# Patient Record
Sex: FEMALE | Race: WHITE | Employment: PART TIME | URBAN - METROPOLITAN AREA
[De-identification: names, ages, dates, MRNs, and addresses within clinical notes are randomized per-mention and may not be internally consistent; named-entity substitution may affect disease eponyms.]

---

## 2010-02-25 LAB
AMB EXT TSH: 1.93 MIU/ML
HCT: 37 % (ref 35–48)
HGB: 12.4 G/DL (ref 12–16)
MEAN CELL VOLUME: 79.7 FL (ref 80–100)
PLATELETS: 262 10(3)UL
WBC: 6.8 X10(3) UL (ref 4–11)

## 2019-07-29 ENCOUNTER — OFFICE VISIT (OUTPATIENT)
Dept: FAMILY MEDICINE CLINIC | Facility: CLINIC | Age: 49
End: 2019-07-29
Payer: COMMERCIAL

## 2019-07-29 ENCOUNTER — LAB ENCOUNTER (OUTPATIENT)
Dept: LAB | Age: 49
End: 2019-07-29
Attending: FAMILY MEDICINE
Payer: COMMERCIAL

## 2019-07-29 VITALS
BODY MASS INDEX: 36.76 KG/M2 | DIASTOLIC BLOOD PRESSURE: 82 MMHG | SYSTOLIC BLOOD PRESSURE: 118 MMHG | WEIGHT: 218 LBS | HEART RATE: 72 BPM | HEIGHT: 64.61 IN

## 2019-07-29 DIAGNOSIS — Z00.00 LABORATORY EXAMINATION ORDERED AS PART OF A ROUTINE GENERAL MEDICAL EXAMINATION: ICD-10-CM

## 2019-07-29 DIAGNOSIS — N91.2 AMENORRHEA: ICD-10-CM

## 2019-07-29 DIAGNOSIS — Z12.11 COLON CANCER SCREENING: ICD-10-CM

## 2019-07-29 DIAGNOSIS — Z12.4 SCREENING FOR MALIGNANT NEOPLASM OF CERVIX: ICD-10-CM

## 2019-07-29 DIAGNOSIS — Z01.419 WELL FEMALE EXAM WITH ROUTINE GYNECOLOGICAL EXAM: Primary | ICD-10-CM

## 2019-07-29 DIAGNOSIS — Z12.39 BREAST CANCER SCREENING: ICD-10-CM

## 2019-07-29 DIAGNOSIS — E66.9 OBESITY (BMI 35.0-39.9 WITHOUT COMORBIDITY): ICD-10-CM

## 2019-07-29 DIAGNOSIS — D22.9 MULTIPLE PIGMENTED NEVI: ICD-10-CM

## 2019-07-29 DIAGNOSIS — L40.9 PSORIASIS: ICD-10-CM

## 2019-07-29 DIAGNOSIS — R73.9 HYPERGLYCEMIA: ICD-10-CM

## 2019-07-29 LAB
ALBUMIN SERPL-MCNC: 3.8 G/DL (ref 3.4–5)
ALBUMIN/GLOB SERPL: 1.2 {RATIO} (ref 1–2)
ALP LIVER SERPL-CCNC: 80 U/L (ref 39–100)
ALT SERPL-CCNC: 36 U/L (ref 13–56)
ANION GAP SERPL CALC-SCNC: 5 MMOL/L (ref 0–18)
AST SERPL-CCNC: 18 U/L (ref 15–37)
BASOPHILS # BLD AUTO: 0.06 X10(3) UL (ref 0–0.2)
BASOPHILS NFR BLD AUTO: 0.9 %
BILIRUB SERPL-MCNC: 0.6 MG/DL (ref 0.1–2)
BUN BLD-MCNC: 11 MG/DL (ref 7–18)
BUN/CREAT SERPL: 13.1 (ref 10–20)
CALCIUM BLD-MCNC: 9.2 MG/DL (ref 8.5–10.1)
CHLORIDE SERPL-SCNC: 108 MMOL/L (ref 98–112)
CHOLEST SMN-MCNC: 162 MG/DL (ref ?–200)
CO2 SERPL-SCNC: 29 MMOL/L (ref 21–32)
CREAT BLD-MCNC: 0.84 MG/DL (ref 0.55–1.02)
DEPRECATED RDW RBC AUTO: 38.2 FL (ref 35.1–46.3)
EOSINOPHIL # BLD AUTO: 0.36 X10(3) UL (ref 0–0.7)
EOSINOPHIL NFR BLD AUTO: 5.6 %
ERYTHROCYTE [DISTWIDTH] IN BLOOD BY AUTOMATED COUNT: 11.9 % (ref 11–15)
ESTRADIOL SERPL-MCNC: 39 PG/ML
GLOBULIN PLAS-MCNC: 3.3 G/DL (ref 2.8–4.4)
GLUCOSE BLD-MCNC: 115 MG/DL (ref 70–99)
HCT VFR BLD AUTO: 41.7 % (ref 35–48)
HDLC SERPL-MCNC: 46 MG/DL (ref 40–59)
HGB BLD-MCNC: 14.3 G/DL (ref 12–16)
IMM GRANULOCYTES # BLD AUTO: 0.02 X10(3) UL (ref 0–1)
IMM GRANULOCYTES NFR BLD: 0.3 %
LDLC SERPL CALC-MCNC: 64 MG/DL (ref ?–100)
LYMPHOCYTES # BLD AUTO: 2.31 X10(3) UL (ref 1–4)
LYMPHOCYTES NFR BLD AUTO: 35.7 %
M PROTEIN MFR SERPL ELPH: 7.1 G/DL (ref 6.4–8.2)
MCH RBC QN AUTO: 30.4 PG (ref 26–34)
MCHC RBC AUTO-ENTMCNC: 34.3 G/DL (ref 31–37)
MCV RBC AUTO: 88.7 FL (ref 80–100)
MONOCYTES # BLD AUTO: 0.49 X10(3) UL (ref 0.1–1)
MONOCYTES NFR BLD AUTO: 7.6 %
NEUTROPHILS # BLD AUTO: 3.23 X10 (3) UL (ref 1.5–7.7)
NEUTROPHILS # BLD AUTO: 3.23 X10(3) UL (ref 1.5–7.7)
NEUTROPHILS NFR BLD AUTO: 49.9 %
NONHDLC SERPL-MCNC: 116 MG/DL (ref ?–130)
OSMOLALITY SERPL CALC.SUM OF ELEC: 294 MOSM/KG (ref 275–295)
PLATELET # BLD AUTO: 255 10(3)UL (ref 150–450)
POTASSIUM SERPL-SCNC: 4.8 MMOL/L (ref 3.5–5.1)
RBC # BLD AUTO: 4.7 X10(6)UL (ref 3.8–5.3)
SODIUM SERPL-SCNC: 142 MMOL/L (ref 136–145)
T4 FREE SERPL-MCNC: 0.8 NG/DL (ref 0.8–1.7)
TRIGL SERPL-MCNC: 260 MG/DL (ref 30–149)
TSI SER-ACNC: 3.08 MIU/ML (ref 0.36–3.74)
VLDLC SERPL CALC-MCNC: 52 MG/DL (ref 0–30)
WBC # BLD AUTO: 6.5 X10(3) UL (ref 4–11)

## 2019-07-29 PROCEDURE — 88175 CYTOPATH C/V AUTO FLUID REDO: CPT | Performed by: FAMILY MEDICINE

## 2019-07-29 PROCEDURE — 80050 GENERAL HEALTH PANEL: CPT | Performed by: FAMILY MEDICINE

## 2019-07-29 PROCEDURE — 36415 COLL VENOUS BLD VENIPUNCTURE: CPT | Performed by: FAMILY MEDICINE

## 2019-07-29 PROCEDURE — 99386 PREV VISIT NEW AGE 40-64: CPT | Performed by: FAMILY MEDICINE

## 2019-07-29 PROCEDURE — 84439 ASSAY OF FREE THYROXINE: CPT | Performed by: FAMILY MEDICINE

## 2019-07-29 PROCEDURE — 80061 LIPID PANEL: CPT | Performed by: FAMILY MEDICINE

## 2019-07-29 PROCEDURE — 82670 ASSAY OF TOTAL ESTRADIOL: CPT | Performed by: FAMILY MEDICINE

## 2019-07-29 PROCEDURE — 87624 HPV HI-RISK TYP POOLED RSLT: CPT | Performed by: FAMILY MEDICINE

## 2019-07-29 PROCEDURE — 83036 HEMOGLOBIN GLYCOSYLATED A1C: CPT | Performed by: FAMILY MEDICINE

## 2019-07-29 RX ORDER — ATORVASTATIN CALCIUM 10 MG/1
10 TABLET, FILM COATED ORAL
Refills: 5 | COMMUNITY
Start: 2019-06-29 | End: 2020-07-31

## 2019-07-29 RX ORDER — ACETYLGLUCOSAMINE 700 MG
1-3 CAPSULE ORAL DAILY
COMMUNITY

## 2019-07-29 RX ORDER — ASPIRIN 81 MG/1
81 TABLET ORAL DAILY
COMMUNITY

## 2019-07-29 NOTE — PROGRESS NOTES
CBC and thyroid are normal.  Lipids elevated triglycerides reduce carbohydrates and saturated fats repeat lipids in 6 months.   Add omega-3 1 g daily and look into Mediterranean diet  Elevated blood sugar please add hemoglobin A1c rest of CMP is normal.

## 2019-07-29 NOTE — PROGRESS NOTES
HPI:   Aryan Gómez is a 47year old female who presents for a complete physical exam.   Symptoms: Ablation done no more periods 2012. Patient complains of some hot flashes. .   Had cholesterol RX Lipitor for 1.4 y/o  Abnormal pap never last one 2015 History    Tobacco Use      Smoking status: Never Smoker      Smokeless tobacco: Never Used    Alcohol use: Yes      Frequency: 2-4 times a month      Drinks per session: 1 or 2      Binge frequency: Never    Drug use: Never    Occ: .  : wally introitus and vagina are normal, normal discharge and normal cervix. Bimanual exam normal no adnexal masses or cervical motion tenderness, PAP was done    MUSCULOSKELETAL: gait elio,l no gross M/S defect.   EXTREMITIES: no clubbing, cyanosis, or edema  NE frequent meals of healthy combinations of protein and carbohydrate. Avoiding packaged/processed. 6. Breast cancer screening  - [facility] SCREENING BILAT (CPT=77067); Future    7.  Laboratory examination ordered as part of a routine general medical examinatio

## 2019-07-30 ENCOUNTER — TELEPHONE (OUTPATIENT)
Dept: FAMILY MEDICINE CLINIC | Facility: CLINIC | Age: 49
End: 2019-07-30

## 2019-07-30 DIAGNOSIS — E78.1 HYPERTRIGLYCERIDEMIA: ICD-10-CM

## 2019-07-30 DIAGNOSIS — R73.9 HYPERGLYCEMIA: Primary | ICD-10-CM

## 2019-07-30 LAB
EST. AVERAGE GLUCOSE BLD GHB EST-MCNC: 123 MG/DL (ref 68–126)
HBA1C MFR BLD HPLC: 5.9 % (ref ?–5.7)
HPV I/H RISK 1 DNA SPEC QL NAA+PROBE: NEGATIVE

## 2019-07-30 NOTE — TELEPHONE ENCOUNTER
LMOM to call office for results. NO answer. HA1C ordered from previous draw. 6 month lipid panel ordered.

## 2019-07-30 NOTE — TELEPHONE ENCOUNTER
A signed release of information form was successfully faxed to [facility] [phone]. I form was sent to scanning also.

## 2019-07-30 NOTE — TELEPHONE ENCOUNTER
----- Message from Chloe Bell PA-C sent at 7/29/2019  6:05 PM CDT -----  CBC and thyroid are normal.  Lipids elevated triglycerides reduce carbohydrates and saturated fats repeat lipids in 6 months.   Add omega-3 1 g daily and look into Mediterranean

## 2019-07-30 NOTE — PROGRESS NOTES
HBA1C indicates glucose intolerance try to reduce weight with diet and exercise. Reduce the simple and complex carbohydrates including the white bread, rice and pasta.  Increase vegetables, fruits, protein and substitute complex carbohydrates with higher f

## 2019-07-31 ENCOUNTER — TELEPHONE (OUTPATIENT)
Dept: FAMILY MEDICINE CLINIC | Facility: CLINIC | Age: 49
End: 2019-07-31

## 2019-07-31 DIAGNOSIS — R73.09 ELEVATED HEMOGLOBIN A1C: Primary | ICD-10-CM

## 2019-07-31 NOTE — TELEPHONE ENCOUNTER
Future labs ordered. Relayed results and recommendations to patient and she verbalized understanding and agreed with plan.      Copy of labs and my chart sign on information placed at  for patient to  per pts request.

## 2019-07-31 NOTE — TELEPHONE ENCOUNTER
----- Message from Lexi Allen PA-C sent at 7/29/2019  6:05 PM CDT -----  CBC and thyroid are normal.  Lipids elevated triglycerides reduce carbohydrates and saturated fats repeat lipids in 6 months.   Add omega-3 1 g daily and look into Mediterranean

## 2019-08-01 ENCOUNTER — TELEPHONE (OUTPATIENT)
Dept: FAMILY MEDICINE CLINIC | Facility: CLINIC | Age: 49
End: 2019-08-01

## 2019-08-01 NOTE — TELEPHONE ENCOUNTER
----- Message from Ashley Whelan PA-C sent at 8/1/2019  7:41 AM CDT -----  Pap is normal HPV negative.

## 2019-08-08 PROCEDURE — 82274 ASSAY TEST FOR BLOOD FECAL: CPT | Performed by: FAMILY MEDICINE

## 2019-08-15 ENCOUNTER — LAB ENCOUNTER (OUTPATIENT)
Dept: LAB | Age: 49
End: 2019-08-15
Attending: FAMILY MEDICINE
Payer: COMMERCIAL

## 2019-08-15 DIAGNOSIS — Z12.11 SPECIAL SCREENING FOR MALIGNANT NEOPLASMS, COLON: Primary | ICD-10-CM

## 2019-09-11 ENCOUNTER — TELEPHONE (OUTPATIENT)
Dept: FAMILY MEDICINE CLINIC | Facility: CLINIC | Age: 49
End: 2019-09-11

## 2019-09-11 NOTE — TELEPHONE ENCOUNTER
The patient was informed that Maria Fernanda Orozco did receive her previous medical records, but the most recent mammogram was from 2016 so she would still be due for the mammogram Maria Fernanda Orozco ordered in July.   The patient agreed to call to schedule her mammogram.  She

## 2020-07-07 ENCOUNTER — TELEPHONE (OUTPATIENT)
Dept: FAMILY MEDICINE CLINIC | Facility: CLINIC | Age: 50
End: 2020-07-07

## 2020-07-07 NOTE — TELEPHONE ENCOUNTER
LOV 7/29/19. Advised patient appointment would be needed to discuss need for requested tests. She declined appointment and states she will call another time to schedule.

## 2020-07-07 NOTE — TELEPHONE ENCOUNTER
Pt came in asking if Franklin Black wants her to have an US Abdominal aortic screening and US carotid doppler bilateral diagnostic testing, the same tests Franklin Black ordered for her  Robert Coma [birth year].

## 2020-07-09 ENCOUNTER — LAB ENCOUNTER (OUTPATIENT)
Dept: LAB | Age: 50
End: 2020-07-09
Attending: FAMILY MEDICINE
Payer: COMMERCIAL

## 2020-07-09 ENCOUNTER — HOSPITAL ENCOUNTER (OUTPATIENT)
Dept: CT IMAGING | Age: 50
Discharge: HOME | End: 2020-07-09
Attending: FAMILY MEDICINE

## 2020-07-09 DIAGNOSIS — E78.1 HYPERTRIGLYCERIDEMIA: ICD-10-CM

## 2020-07-09 DIAGNOSIS — Z13.9 ENCOUNTER FOR SCREENING: ICD-10-CM

## 2020-07-09 DIAGNOSIS — R73.09 ELEVATED HEMOGLOBIN A1C: ICD-10-CM

## 2020-07-09 LAB
CHOLEST SMN-MCNC: 191 MG/DL (ref ?–200)
EST. AVERAGE GLUCOSE BLD GHB EST-MCNC: 126 MG/DL (ref 68–126)
HBA1C MFR BLD HPLC: 6 % (ref ?–5.7)
HDLC SERPL-MCNC: 47 MG/DL (ref 40–59)
LDLC SERPL CALC-MCNC: 80 MG/DL (ref ?–100)
NONHDLC SERPL-MCNC: 144 MG/DL (ref ?–130)
PATIENT FASTING Y/N/NP: YES
TRIGL SERPL-MCNC: 318 MG/DL (ref 30–149)
VLDLC SERPL CALC-MCNC: 64 MG/DL (ref 0–30)

## 2020-07-09 PROCEDURE — 36415 COLL VENOUS BLD VENIPUNCTURE: CPT

## 2020-07-09 PROCEDURE — 80061 LIPID PANEL: CPT

## 2020-07-09 PROCEDURE — 83036 HEMOGLOBIN GLYCOSYLATED A1C: CPT

## 2020-07-09 NOTE — PROGRESS NOTES
You have a small solid pulmonary nodule that is 4 mm or smaller this does not need further testing since you are not a smoker. If you do have any prolonged exposure to chemicals that could put you at risk please send message back.   Sent to my chart

## 2020-07-11 NOTE — PROGRESS NOTES
Follow-up in the office for wellness. Crease carbohydrates. Decrease carbohydrates both simple and complex. Triglycerides are increasing start Omega 3 take a total of 2 grams (2,000 mg) of EPA plus DHA. Limit SUGAR!   Reduce all carbohydrates both sim

## 2020-07-14 ENCOUNTER — TELEPHONE (OUTPATIENT)
Dept: FAMILY MEDICINE CLINIC | Facility: CLINIC | Age: 50
End: 2020-07-14

## 2020-07-14 DIAGNOSIS — E78.1 HYPERTRIGLYCERIDEMIA: ICD-10-CM

## 2020-07-14 DIAGNOSIS — R73.09 ELEVATED HEMOGLOBIN A1C: Primary | ICD-10-CM

## 2020-07-14 NOTE — TELEPHONE ENCOUNTER
----- Message from Nacho Marcial PA-C sent at 7/11/2020  9:04 AM CDT -----  Follow-up in the office for wellness. Crease carbohydrates. Decrease carbohydrates both simple and complex.   Triglycerides are increasing start Omega 3 take a total of 2 gram

## 2020-07-29 ENCOUNTER — HOSPITAL ENCOUNTER (OUTPATIENT)
Dept: MAMMOGRAPHY | Age: 50
Discharge: HOME | End: 2020-07-29
Attending: FAMILY MEDICINE
Payer: COMMERCIAL

## 2020-07-29 DIAGNOSIS — Z12.39 BREAST CANCER SCREENING: ICD-10-CM

## 2020-07-29 PROCEDURE — 77063 BREAST TOMOSYNTHESIS BI: CPT | Performed by: FAMILY MEDICINE

## 2020-07-29 PROCEDURE — 77067 SCR MAMMO BI INCL CAD: CPT | Performed by: FAMILY MEDICINE

## 2020-07-29 NOTE — PROGRESS NOTES
4 mm oval nodule probable intramammary lymph node. There is more typical lymph node in the upper-outer quadrant of the left breast.  If there are no comparisons available thin   additional mammographic views and ultrasound are recommended.   Comparison pavithra

## 2020-07-31 ENCOUNTER — OFFICE VISIT (OUTPATIENT)
Dept: FAMILY MEDICINE CLINIC | Facility: CLINIC | Age: 50
End: 2020-07-31
Payer: COMMERCIAL

## 2020-07-31 VITALS
WEIGHT: 224 LBS | BODY MASS INDEX: 37.78 KG/M2 | SYSTOLIC BLOOD PRESSURE: 110 MMHG | TEMPERATURE: 97 F | DIASTOLIC BLOOD PRESSURE: 68 MMHG | HEART RATE: 80 BPM | HEIGHT: 64.49 IN

## 2020-07-31 DIAGNOSIS — Z00.00 LABORATORY EXAMINATION ORDERED AS PART OF A ROUTINE GENERAL MEDICAL EXAMINATION: ICD-10-CM

## 2020-07-31 DIAGNOSIS — D22.9 MULTIPLE PIGMENTED NEVI: ICD-10-CM

## 2020-07-31 DIAGNOSIS — E78.2 MIXED HYPERLIPIDEMIA: ICD-10-CM

## 2020-07-31 DIAGNOSIS — E78.1 HYPERTRIGLYCERIDEMIA: ICD-10-CM

## 2020-07-31 DIAGNOSIS — L40.9 PSORIASIS: ICD-10-CM

## 2020-07-31 DIAGNOSIS — Z12.11 COLON CANCER SCREENING: ICD-10-CM

## 2020-07-31 DIAGNOSIS — E66.9 OBESITY (BMI 35.0-39.9 WITHOUT COMORBIDITY): ICD-10-CM

## 2020-07-31 DIAGNOSIS — Z00.00 WELL FEMALE EXAM WITHOUT GYNECOLOGICAL EXAM: Primary | ICD-10-CM

## 2020-07-31 DIAGNOSIS — Z23 NEED FOR VACCINATION: ICD-10-CM

## 2020-07-31 PROCEDURE — 99396 PREV VISIT EST AGE 40-64: CPT | Performed by: FAMILY MEDICINE

## 2020-07-31 PROCEDURE — 90471 IMMUNIZATION ADMIN: CPT | Performed by: FAMILY MEDICINE

## 2020-07-31 PROCEDURE — 99212 OFFICE O/P EST SF 10 MIN: CPT | Performed by: FAMILY MEDICINE

## 2020-07-31 PROCEDURE — 3008F BODY MASS INDEX DOCD: CPT | Performed by: FAMILY MEDICINE

## 2020-07-31 PROCEDURE — 3074F SYST BP LT 130 MM HG: CPT | Performed by: FAMILY MEDICINE

## 2020-07-31 PROCEDURE — 90715 TDAP VACCINE 7 YRS/> IM: CPT | Performed by: FAMILY MEDICINE

## 2020-07-31 PROCEDURE — 3078F DIAST BP <80 MM HG: CPT | Performed by: FAMILY MEDICINE

## 2020-07-31 RX ORDER — ATORVASTATIN CALCIUM 10 MG/1
10 TABLET, FILM COATED ORAL
Qty: 90 TABLET | Refills: 1 | Status: SHIPPED | OUTPATIENT
Start: 2020-07-31

## 2020-07-31 RX ORDER — OMEGA-3-ACID ETHYL ESTERS 1 G/1
2 CAPSULE, LIQUID FILLED ORAL DAILY
Qty: 180 CAPSULE | Refills: 3 | Status: SHIPPED | OUTPATIENT
Start: 2020-07-31 | End: 2021-08-06

## 2020-07-31 NOTE — PROGRESS NOTES
HPI:   Dino Smith is a 45year old female who presents for a complete physical exam.   Shoulder pain sleeping on a waterbed. Symptoms: Ablation done no more periods 2012. Patient complains of some hot flashes manageable.       Wants to loose mg/dL    VLDL 64 (H) 0 - 30 mg/dL    Non HDL Chol 144 (H) <130 mg/dL    FASTING Yes    HEMOGLOBIN A1C   Result Value Ref Range    HgbA1C 6.0 (H) <5.7 %    Estimated Average Glucose 126 68 - 126 mg/dL        Current Outpatient Medications   Medication Sig D Never    Occ: . : yes. Children: no.   Exercise: none.   Diet: watches minimally     REVIEW OF SYSTEMS:   GENERAL: denies fevers, weakness, trouble sleeping or weight changes  SKIN: denies any unusual skin lesions or rashes  EYES:denies vis cyanosis, or edema  NEURO: oriented times three, cranial nerves are grossly intact, no gross motor or sensory deficit.     ASSESSMENT AND PLAN:   Reyes Hernandez is a 45year old female who presents for a complete physical exam.   Well female exam carbohydrate. Avoiding packaged/processed. 5. Hypertriglyceridemia  Start  Omega-3-acid Ethyl Esters 1 g Oral Cap; Take 2 capsules (2 g total) by mouth daily. Dispense: 180 capsule; Refill: 3  Carbohydrates.   6. Mixed hyperlipidemia  - atorvastatin 10

## 2020-08-06 NOTE — PROGRESS NOTES
RECOMMENDATIONS: After comparing prior mammograms additional evaluation is recommended  ULTRASOUND: LEFT BREAST  Sent to my chart

## 2021-04-09 DIAGNOSIS — Z23 NEED FOR VACCINATION: ICD-10-CM

## 2021-08-06 ENCOUNTER — OFFICE VISIT (OUTPATIENT)
Dept: FAMILY MEDICINE CLINIC | Facility: CLINIC | Age: 51
End: 2021-08-06
Payer: COMMERCIAL

## 2021-08-06 VITALS
DIASTOLIC BLOOD PRESSURE: 82 MMHG | HEIGHT: 64.33 IN | WEIGHT: 221 LBS | BODY MASS INDEX: 37.73 KG/M2 | HEART RATE: 72 BPM | SYSTOLIC BLOOD PRESSURE: 118 MMHG

## 2021-08-06 DIAGNOSIS — E78.2 MIXED HYPERLIPIDEMIA: ICD-10-CM

## 2021-08-06 DIAGNOSIS — Z13.820 OSTEOPOROSIS SCREENING: ICD-10-CM

## 2021-08-06 DIAGNOSIS — Z12.11 COLON CANCER SCREENING: ICD-10-CM

## 2021-08-06 DIAGNOSIS — L40.9 PSORIASIS: ICD-10-CM

## 2021-08-06 DIAGNOSIS — Z00.00 LABORATORY TESTS ORDERED AS PART OF A COMPLETE PHYSICAL EXAM (CPE): ICD-10-CM

## 2021-08-06 DIAGNOSIS — Z01.419 WELL FEMALE EXAM WITH ROUTINE GYNECOLOGICAL EXAM: Primary | ICD-10-CM

## 2021-08-06 DIAGNOSIS — D22.9 MULTIPLE PIGMENTED NEVI: ICD-10-CM

## 2021-08-06 DIAGNOSIS — R73.09 ELEVATED HEMOGLOBIN A1C: ICD-10-CM

## 2021-08-06 DIAGNOSIS — Z12.31 VISIT FOR SCREENING MAMMOGRAM: ICD-10-CM

## 2021-08-06 DIAGNOSIS — E78.1 HYPERTRIGLYCERIDEMIA: ICD-10-CM

## 2021-08-06 PROCEDURE — 3074F SYST BP LT 130 MM HG: CPT | Performed by: FAMILY MEDICINE

## 2021-08-06 PROCEDURE — 3008F BODY MASS INDEX DOCD: CPT | Performed by: FAMILY MEDICINE

## 2021-08-06 PROCEDURE — 3079F DIAST BP 80-89 MM HG: CPT | Performed by: FAMILY MEDICINE

## 2021-08-06 PROCEDURE — 99396 PREV VISIT EST AGE 40-64: CPT | Performed by: FAMILY MEDICINE

## 2021-08-06 NOTE — PROGRESS NOTES
HPI:   Susana Matthews is a 45year old female who presents for a complete physical exam.   Overdue for colonoscopy, shingles vaccine and mammogram  COVID vaccine  done  Dental exams yes  Eye exams today  PHQ9   @ a 0   Sleep Apnea  None witnesse kg/m².        Results for orders placed or performed in visit on 07/09/20   LIPID PANEL   Result Value Ref Range    Cholesterol, Total 191 <200 mg/dL    HDL Cholesterol 47 40 - 59 mg/dL    Triglycerides 318 (H) 30 - 149 mg/dL    LDL Cholesterol 80 <100 mg/d Vaping Use      Vaping Use: Never used    Alcohol use: Yes      Alcohol/week: 2.0 standard drinks      Types: 2 Standard drinks or equivalent per week    Drug use: Never    Occ: . : yes. Children: no.   Exercise: none.   Diet: watches mini are normal,   Bimanual exam normal no adnexal masses or cervical motion tenderness,     MUSCULOSKELETAL: gait elio,l no gross M/S defect.   EXTREMITIES: no clubbing, cyanosis, or edema  NEURO: oriented times three, cranial nerves are grossly intact, no keira complete physical exam (CPE)  - LIPID PANEL; Future  - HEMOGLOBIN A1C; Future  - COMP METABOLIC PANEL (14); Future  - CBC WITH DIFFERENTIAL WITH PLATELET; Future  - TSH+FREE T4; Future    8.  Visit for screening mammogram  - [facility] 2D+3D SCREENING BILAT (

## 2021-08-06 NOTE — PATIENT INSTRUCTIONS
Routine [facility] for Women   Routine checkups can find treatable problems early. For many medical problems, early treatment can help prevent more serious complications. The value of checkups and how often you have them depend mainly on your age.  Your per family history of high cholesterol. • Colorectal cancer test: if you are 48 or older.  Recommended tests include a yearly test for blood in the stool, called the fecal occult blood test (FOBT) or fecal immunochemical test (FIT), and one of the following t personal and family medical history. Many other tests are often done at routine checkups, but there is no current evidence that they are helpful as routine screening tests for healthy women.  Examples of such tests are a CBC (complete blood count), thyroi a younger age if you have a high-risk medical condition, such as diabetes. • Varicella (chickenpox) if you have never had chickenpox. • Zoster (shingles) vaccine: if you are 50 or older. The vaccine can help prevent shingles.  It can also reduce the anton

## 2021-08-11 ENCOUNTER — LAB ENCOUNTER (OUTPATIENT)
Dept: LAB | Facility: HOSPITAL | Age: 51
End: 2021-08-11
Attending: FAMILY MEDICINE
Payer: COMMERCIAL

## 2021-08-11 DIAGNOSIS — Z12.11 COLON CANCER SCREENING: Primary | ICD-10-CM

## 2021-08-11 PROCEDURE — 82274 ASSAY TEST FOR BLOOD FECAL: CPT

## 2021-08-12 ENCOUNTER — LAB ENCOUNTER (OUTPATIENT)
Dept: LAB | Age: 51
End: 2021-08-12
Attending: FAMILY MEDICINE
Payer: COMMERCIAL

## 2021-08-12 DIAGNOSIS — E78.2 MIXED HYPERLIPIDEMIA: ICD-10-CM

## 2021-08-12 DIAGNOSIS — E78.1 HYPERTRIGLYCERIDEMIA: ICD-10-CM

## 2021-08-12 DIAGNOSIS — Z00.00 LABORATORY TESTS ORDERED AS PART OF A COMPLETE PHYSICAL EXAM (CPE): ICD-10-CM

## 2021-08-12 DIAGNOSIS — R73.09 ELEVATED HEMOGLOBIN A1C: ICD-10-CM

## 2021-08-12 LAB
ALBUMIN SERPL-MCNC: 3.8 G/DL (ref 3.4–5)
ALBUMIN/GLOB SERPL: 1.1 {RATIO} (ref 1–2)
ALP LIVER SERPL-CCNC: 94 U/L
ALT SERPL-CCNC: 53 U/L
ANION GAP SERPL CALC-SCNC: 3 MMOL/L (ref 0–18)
AST SERPL-CCNC: 24 U/L (ref 15–37)
BASOPHILS # BLD AUTO: 0.05 X10(3) UL (ref 0–0.2)
BASOPHILS NFR BLD AUTO: 0.7 %
BILIRUB SERPL-MCNC: 0.6 MG/DL (ref 0.1–2)
BUN BLD-MCNC: 12 MG/DL (ref 7–18)
CALCIUM BLD-MCNC: 9.1 MG/DL (ref 8.5–10.1)
CHLORIDE SERPL-SCNC: 108 MMOL/L (ref 98–112)
CHOLEST SMN-MCNC: 195 MG/DL (ref ?–200)
CO2 SERPL-SCNC: 29 MMOL/L (ref 21–32)
CREAT BLD-MCNC: 0.93 MG/DL
EOSINOPHIL # BLD AUTO: 0.32 X10(3) UL (ref 0–0.7)
EOSINOPHIL NFR BLD AUTO: 4.5 %
ERYTHROCYTE [DISTWIDTH] IN BLOOD BY AUTOMATED COUNT: 12.1 %
EST. AVERAGE GLUCOSE BLD GHB EST-MCNC: 128 MG/DL (ref 68–126)
GLOBULIN PLAS-MCNC: 3.6 G/DL (ref 2.8–4.4)
GLUCOSE BLD-MCNC: 100 MG/DL (ref 70–99)
HBA1C MFR BLD HPLC: 6.1 % (ref ?–5.7)
HCT VFR BLD AUTO: 43.7 %
HDLC SERPL-MCNC: 46 MG/DL (ref 40–59)
HGB BLD-MCNC: 15.1 G/DL
IMM GRANULOCYTES # BLD AUTO: 0.02 X10(3) UL (ref 0–1)
IMM GRANULOCYTES NFR BLD: 0.3 %
LDLC SERPL CALC-MCNC: 106 MG/DL (ref ?–100)
LYMPHOCYTES # BLD AUTO: 2.87 X10(3) UL (ref 1–4)
LYMPHOCYTES NFR BLD AUTO: 40.4 %
M PROTEIN MFR SERPL ELPH: 7.4 G/DL (ref 6.4–8.2)
MCH RBC QN AUTO: 29.8 PG (ref 26–34)
MCHC RBC AUTO-ENTMCNC: 34.6 G/DL (ref 31–37)
MCV RBC AUTO: 86.2 FL
MONOCYTES # BLD AUTO: 0.56 X10(3) UL (ref 0.1–1)
MONOCYTES NFR BLD AUTO: 7.9 %
NEUTROPHILS # BLD AUTO: 3.29 X10 (3) UL (ref 1.5–7.7)
NEUTROPHILS # BLD AUTO: 3.29 X10(3) UL (ref 1.5–7.7)
NEUTROPHILS NFR BLD AUTO: 46.2 %
NONHDLC SERPL-MCNC: 149 MG/DL (ref ?–130)
OSMOLALITY SERPL CALC.SUM OF ELEC: 290 MOSM/KG (ref 275–295)
PATIENT FASTING Y/N/NP: YES
PATIENT FASTING Y/N/NP: YES
PLATELET # BLD AUTO: 231 10(3)UL (ref 150–450)
POTASSIUM SERPL-SCNC: 4.7 MMOL/L (ref 3.5–5.1)
RBC # BLD AUTO: 5.07 X10(6)UL
SODIUM SERPL-SCNC: 140 MMOL/L (ref 136–145)
T4 FREE SERPL-MCNC: 0.8 NG/DL (ref 0.8–1.7)
TRIGL SERPL-MCNC: 254 MG/DL (ref 30–149)
TSI SER-ACNC: 2.54 MIU/ML (ref 0.36–3.74)
VLDLC SERPL CALC-MCNC: 43 MG/DL (ref 0–30)
WBC # BLD AUTO: 7.1 X10(3) UL (ref 4–11)

## 2021-08-12 PROCEDURE — 80061 LIPID PANEL: CPT

## 2021-08-12 PROCEDURE — 84439 ASSAY OF FREE THYROXINE: CPT

## 2021-08-12 PROCEDURE — 85025 COMPLETE CBC W/AUTO DIFF WBC: CPT

## 2021-08-12 PROCEDURE — 80053 COMPREHEN METABOLIC PANEL: CPT

## 2021-08-12 PROCEDURE — 84443 ASSAY THYROID STIM HORMONE: CPT

## 2021-08-12 PROCEDURE — 36415 COLL VENOUS BLD VENIPUNCTURE: CPT

## 2021-08-12 PROCEDURE — 83036 HEMOGLOBIN GLYCOSYLATED A1C: CPT

## 2021-08-13 ENCOUNTER — TELEPHONE (OUTPATIENT)
Dept: FAMILY MEDICINE CLINIC | Facility: CLINIC | Age: 51
End: 2021-08-13

## 2021-08-13 DIAGNOSIS — E78.1 HYPERTRIGLYCERIDEMIA: ICD-10-CM

## 2021-08-13 DIAGNOSIS — R73.9 HYPERGLYCEMIA: Primary | ICD-10-CM

## 2021-08-13 LAB — HEMOCCULT STL QL: NEGATIVE

## 2021-08-13 NOTE — PROGRESS NOTES
Hemoglobin A1c 6.1 increasing risk for diabetes follow-up in the office to discuss. Normal thyroid and complete blood count. HBA1C indicates glucose intolerance try to reduce weight with diet and exercise.   Reduce the simple and complex carbohydrates incl

## 2021-08-13 NOTE — TELEPHONE ENCOUNTER
----- Message from Bertis Litten, PA-C sent at 8/12/2021  8:25 PM CDT -----  Hemoglobin A1c 6.1 increasing risk for diabetes follow-up in the office to discuss. Normal thyroid and complete blood count.  HBA1C indicates glucose intolerance try to reduce

## 2022-08-12 ENCOUNTER — LAB ENCOUNTER (OUTPATIENT)
Dept: LAB | Age: 52
End: 2022-08-12
Attending: FAMILY MEDICINE
Payer: COMMERCIAL

## 2022-08-12 ENCOUNTER — OFFICE VISIT (OUTPATIENT)
Dept: FAMILY MEDICINE CLINIC | Facility: CLINIC | Age: 52
End: 2022-08-12
Payer: COMMERCIAL

## 2022-08-12 VITALS
OXYGEN SATURATION: 98 % | BODY MASS INDEX: 38.28 KG/M2 | DIASTOLIC BLOOD PRESSURE: 72 MMHG | HEART RATE: 84 BPM | SYSTOLIC BLOOD PRESSURE: 102 MMHG | WEIGHT: 227 LBS | HEIGHT: 64.49 IN | TEMPERATURE: 97 F

## 2022-08-12 DIAGNOSIS — E78.2 MIXED HYPERLIPIDEMIA: ICD-10-CM

## 2022-08-12 DIAGNOSIS — R73.9 HYPERGLYCEMIA: ICD-10-CM

## 2022-08-12 DIAGNOSIS — Z13.228 SCREENING FOR ENDOCRINE, METABOLIC AND IMMUNITY DISORDER: ICD-10-CM

## 2022-08-12 DIAGNOSIS — Z12.11 COLON CANCER SCREENING: ICD-10-CM

## 2022-08-12 DIAGNOSIS — Z13.0 SCREENING FOR ENDOCRINE, METABOLIC AND IMMUNITY DISORDER: ICD-10-CM

## 2022-08-12 DIAGNOSIS — Z13.220 LIPID SCREENING: ICD-10-CM

## 2022-08-12 DIAGNOSIS — L40.9 PSORIASIS: ICD-10-CM

## 2022-08-12 DIAGNOSIS — D22.9 MULTIPLE PIGMENTED NEVI: ICD-10-CM

## 2022-08-12 DIAGNOSIS — Z13.29 SCREENING FOR ENDOCRINE, METABOLIC AND IMMUNITY DISORDER: ICD-10-CM

## 2022-08-12 DIAGNOSIS — Z12.4 SCREENING FOR MALIGNANT NEOPLASM OF CERVIX: ICD-10-CM

## 2022-08-12 DIAGNOSIS — E78.1 HYPERTRIGLYCERIDEMIA: ICD-10-CM

## 2022-08-12 DIAGNOSIS — Z78.0 POST-MENOPAUSAL: ICD-10-CM

## 2022-08-12 DIAGNOSIS — Z12.31 VISIT FOR SCREENING MAMMOGRAM: ICD-10-CM

## 2022-08-12 DIAGNOSIS — Z23 NEED FOR VACCINATION: ICD-10-CM

## 2022-08-12 DIAGNOSIS — Z01.419 WELL FEMALE EXAM WITH ROUTINE GYNECOLOGICAL EXAM: Primary | ICD-10-CM

## 2022-08-12 DIAGNOSIS — K21.9 GASTROESOPHAGEAL REFLUX DISEASE WITHOUT ESOPHAGITIS: ICD-10-CM

## 2022-08-12 LAB
ALBUMIN SERPL-MCNC: 4.1 G/DL (ref 3.4–5)
ALBUMIN/GLOB SERPL: 1.3 {RATIO} (ref 1–2)
ALP LIVER SERPL-CCNC: 98 U/L
ALT SERPL-CCNC: 68 U/L
ANION GAP SERPL CALC-SCNC: 1 MMOL/L (ref 0–18)
AST SERPL-CCNC: 32 U/L (ref 15–37)
BASOPHILS # BLD AUTO: 0.07 X10(3) UL (ref 0–0.2)
BASOPHILS NFR BLD AUTO: 0.8 %
BILIRUB SERPL-MCNC: 0.5 MG/DL (ref 0.1–2)
BUN BLD-MCNC: 11 MG/DL (ref 7–18)
CALCIUM BLD-MCNC: 9.2 MG/DL (ref 8.5–10.1)
CHLORIDE SERPL-SCNC: 108 MMOL/L (ref 98–112)
CHOLEST SERPL-MCNC: 176 MG/DL (ref ?–200)
CO2 SERPL-SCNC: 28 MMOL/L (ref 21–32)
CREAT BLD-MCNC: 0.85 MG/DL
EOSINOPHIL # BLD AUTO: 0.4 X10(3) UL (ref 0–0.7)
EOSINOPHIL NFR BLD AUTO: 4.4 %
ERYTHROCYTE [DISTWIDTH] IN BLOOD BY AUTOMATED COUNT: 12.4 %
EST. AVERAGE GLUCOSE BLD GHB EST-MCNC: 131 MG/DL (ref 68–126)
FASTING PATIENT LIPID ANSWER: YES
FASTING STATUS PATIENT QL REPORTED: YES
GFR SERPLBLD BASED ON 1.73 SQ M-ARVRAT: 82 ML/MIN/1.73M2 (ref 60–?)
GLOBULIN PLAS-MCNC: 3.2 G/DL (ref 2.8–4.4)
GLUCOSE BLD-MCNC: 112 MG/DL (ref 70–99)
HBA1C MFR BLD: 6.2 % (ref ?–5.7)
HCT VFR BLD AUTO: 45.3 %
HDLC SERPL-MCNC: 43 MG/DL (ref 40–59)
HGB BLD-MCNC: 14.9 G/DL
IMM GRANULOCYTES # BLD AUTO: 0.02 X10(3) UL (ref 0–1)
IMM GRANULOCYTES NFR BLD: 0.2 %
LDLC SERPL CALC-MCNC: 80 MG/DL (ref ?–100)
LYMPHOCYTES # BLD AUTO: 3.12 X10(3) UL (ref 1–4)
LYMPHOCYTES NFR BLD AUTO: 34.3 %
MCH RBC QN AUTO: 29.6 PG (ref 26–34)
MCHC RBC AUTO-ENTMCNC: 32.9 G/DL (ref 31–37)
MCV RBC AUTO: 90.1 FL
MONOCYTES # BLD AUTO: 0.74 X10(3) UL (ref 0.1–1)
MONOCYTES NFR BLD AUTO: 8.1 %
NEUTROPHILS # BLD AUTO: 4.74 X10 (3) UL (ref 1.5–7.7)
NEUTROPHILS # BLD AUTO: 4.74 X10(3) UL (ref 1.5–7.7)
NEUTROPHILS NFR BLD AUTO: 52.2 %
NONHDLC SERPL-MCNC: 133 MG/DL (ref ?–130)
OSMOLALITY SERPL CALC.SUM OF ELEC: 284 MOSM/KG (ref 275–295)
PLATELET # BLD AUTO: 260 10(3)UL (ref 150–450)
POTASSIUM SERPL-SCNC: 4.8 MMOL/L (ref 3.5–5.1)
PROT SERPL-MCNC: 7.3 G/DL (ref 6.4–8.2)
RBC # BLD AUTO: 5.03 X10(6)UL
SODIUM SERPL-SCNC: 137 MMOL/L (ref 136–145)
T4 FREE SERPL-MCNC: 0.9 NG/DL (ref 0.8–1.7)
TRIGL SERPL-MCNC: 324 MG/DL (ref 30–149)
TSI SER-ACNC: 3.43 MIU/ML (ref 0.36–3.74)
VLDLC SERPL CALC-MCNC: 51 MG/DL (ref 0–30)
WBC # BLD AUTO: 9.1 X10(3) UL (ref 4–11)

## 2022-08-12 PROCEDURE — 3074F SYST BP LT 130 MM HG: CPT | Performed by: FAMILY MEDICINE

## 2022-08-12 PROCEDURE — 85025 COMPLETE CBC W/AUTO DIFF WBC: CPT

## 2022-08-12 PROCEDURE — 84439 ASSAY OF FREE THYROXINE: CPT

## 2022-08-12 PROCEDURE — 3008F BODY MASS INDEX DOCD: CPT | Performed by: FAMILY MEDICINE

## 2022-08-12 PROCEDURE — 99213 OFFICE O/P EST LOW 20 MIN: CPT | Performed by: FAMILY MEDICINE

## 2022-08-12 PROCEDURE — 99396 PREV VISIT EST AGE 40-64: CPT | Performed by: FAMILY MEDICINE

## 2022-08-12 PROCEDURE — 80061 LIPID PANEL: CPT

## 2022-08-12 PROCEDURE — 83036 HEMOGLOBIN GLYCOSYLATED A1C: CPT

## 2022-08-12 PROCEDURE — 84443 ASSAY THYROID STIM HORMONE: CPT

## 2022-08-12 PROCEDURE — 3078F DIAST BP <80 MM HG: CPT | Performed by: FAMILY MEDICINE

## 2022-08-12 PROCEDURE — 36415 COLL VENOUS BLD VENIPUNCTURE: CPT

## 2022-08-12 PROCEDURE — 80053 COMPREHEN METABOLIC PANEL: CPT

## 2022-08-12 PROCEDURE — 87624 HPV HI-RISK TYP POOLED RSLT: CPT | Performed by: FAMILY MEDICINE

## 2022-08-13 ENCOUNTER — HOSPITAL ENCOUNTER (OUTPATIENT)
Dept: MAMMOGRAPHY | Age: 52
Discharge: HOME | End: 2022-08-13
Attending: FAMILY MEDICINE
Payer: COMMERCIAL

## 2022-08-13 ENCOUNTER — HOSPITAL ENCOUNTER (OUTPATIENT)
Dept: BONE DENSITY | Age: 52
Discharge: HOME | End: 2022-08-13
Attending: FAMILY MEDICINE
Payer: COMMERCIAL

## 2022-08-13 DIAGNOSIS — Z78.0 POST-MENOPAUSAL: ICD-10-CM

## 2022-08-13 DIAGNOSIS — Z12.31 VISIT FOR SCREENING MAMMOGRAM: ICD-10-CM

## 2022-08-13 PROBLEM — K21.9 GASTROESOPHAGEAL REFLUX DISEASE WITHOUT ESOPHAGITIS: Status: ACTIVE | Noted: 2022-08-13

## 2022-08-13 PROBLEM — R73.9 HYPERGLYCEMIA: Status: ACTIVE | Noted: 2022-08-13

## 2022-08-13 PROCEDURE — 77063 BREAST TOMOSYNTHESIS BI: CPT | Performed by: FAMILY MEDICINE

## 2022-08-13 PROCEDURE — 77067 SCR MAMMO BI INCL CAD: CPT | Performed by: FAMILY MEDICINE

## 2022-08-13 NOTE — PROGRESS NOTES
Normal white and red blood cell counts. Normal kidney testing. Elevated ALT (liver enzyme) limit alcohol, avoid acetaminophen and lower saturated fats repeat liver function tests in 2 months. Normal LDL and cholesterol elevated triglycerides. Elevated triglycerides Omega 3 take a total of 2-3 grams (2,000-3,000 mg) of EPA plus DHA. Limit SUGAR! Reduce all carbohydrates both simple and complex. Increase the fiber. Exercise needs to loose weight. Eat fish 2 times per week. [place], herring, sardines, tuna and mackerel are a few types of fish that are especially high in omega-3 fatty acids. Can eat fruits and vegetables and small frequent meals of healthy combinations of protein and carbohydrate (fruits/vegetables). Avoiding packaged/processed foods and alcohol. Repeat triglyceride testing in 2 months. Normal thyroid testing    HBA1C indicates glucose intolerance try to reduce weight with diet and exercise. Reduce the simple and complex carbohydrates including the white bread, rice and pasta. Increase vegetables, fruits, protein and substitute complex carbohydrates with higher fiber/grained carbohydrates. Repeat the HBA1C in 6 months. Place future orders for 2 months for liver function tests, triglycerides and 6 months for hemoglobin A1c.

## 2022-08-15 LAB — HPV I/H RISK 1 DNA SPEC QL NAA+PROBE: NEGATIVE

## 2022-08-17 ENCOUNTER — TELEPHONE (OUTPATIENT)
Dept: FAMILY MEDICINE CLINIC | Facility: CLINIC | Age: 52
End: 2022-08-17

## 2022-08-17 DIAGNOSIS — E78.2 MIXED HYPERLIPIDEMIA: Primary | ICD-10-CM

## 2022-08-17 DIAGNOSIS — R73.9 HYPERGLYCEMIA: ICD-10-CM

## 2022-08-17 NOTE — TELEPHONE ENCOUNTER
----- Message from Pérez William PA-C sent at 8/13/2022  5:39 AM CDT -----  Normal white and red blood cell counts. Normal kidney testing. Elevated ALT (liver enzyme) limit alcohol, avoid acetaminophen and lower saturated fats repeat liver function tests in 2 months. Normal LDL and cholesterol elevated triglycerides. Elevated triglycerides Omega 3 take a total of 2-3 grams (2,000-3,000 mg) of EPA plus DHA. Limit SUGAR! Reduce all carbohydrates both simple and complex. Increase the fiber. Exercise needs to loose weight. Eat fish 2 times per week. [place], herring, sardines, tuna and mackerel are a few types of fish that are especially high in omega-3 fatty acids. Can eat fruits and vegetables and small frequent meals of healthy combinations of protein and carbohydrate (fruits/vegetables). Avoiding packaged/processed foods and alcohol. Repeat triglyceride testing in 2 months. Normal thyroid testing    HBA1C indicates glucose intolerance try to reduce weight with diet and exercise. Reduce the simple and complex carbohydrates including the white bread, rice and pasta. Increase vegetables, fruits, protein and substitute complex carbohydrates with higher fiber/grained carbohydrates. Repeat the HBA1C in 6 months. Place future orders for 2 months for liver function tests, triglycerides and 6 months for hemoglobin A1c.

## 2022-08-29 LAB — LAST PAP RESULT: NORMAL

## 2022-11-03 ENCOUNTER — TELEPHONE (OUTPATIENT)
Dept: FAMILY MEDICINE CLINIC | Facility: CLINIC | Age: 52
End: 2022-11-03

## 2022-11-03 NOTE — TELEPHONE ENCOUNTER
[facility] is following up on lab order for colon cancer screening that was mailed to pt. Left message to call back.

## 2022-11-04 ENCOUNTER — TELEPHONE (OUTPATIENT)
Dept: FAMILY MEDICINE CLINIC | Facility: CLINIC | Age: 52
End: 2022-11-04

## 2022-11-04 NOTE — TELEPHONE ENCOUNTER
Patient needs to have mammogram order to state 'screening\" and not \"diagnostic\". She  Already had it done 08/13/2022. Her insurance will not pay for it if states  \"diagnostic\". Also needs code for dexascan.

## 2022-11-04 NOTE — TELEPHONE ENCOUNTER
Spoke to patient. Symptoms are as stated below. Advised patient to proceed to UC for evaluation and treatment. She states she has to go to work and will do so after work.

## 2022-11-04 NOTE — TELEPHONE ENCOUNTER
Spoke to patient. Advised back in August a screening mammogram was done. She said she will speak to her insurance. She also has cleared up the DEXA scan question. Her insurance told her they pay $250 for DEXA and that [facility] Gallego is [facility].  She will look at the in network facilities and determine cost.

## 2022-11-04 NOTE — TELEPHONE ENCOUNTER
Pt called states she has had congestion and cough x2 weeks, runny nose, scratchy dry throat and for last week has had pain behind eye and teeth and gum pain.  Seeking advice

## 2023-01-06 ENCOUNTER — TELEPHONE (OUTPATIENT)
Dept: FAMILY MEDICINE CLINIC | Facility: CLINIC | Age: 53
End: 2023-01-06

## 2023-01-06 NOTE — TELEPHONE ENCOUNTER
[facility] is following up on lab order for colon cancer screening that was mailed to pt. Please encourage pt to complete this test within the next 2 wks. Discussed in detail w/patient. Patient verbalized understanding.

## 2023-07-25 ENCOUNTER — LAB ENCOUNTER (OUTPATIENT)
Dept: LAB | Age: 53
End: 2023-07-25
Attending: FAMILY MEDICINE
Payer: COMMERCIAL

## 2023-07-25 DIAGNOSIS — Z12.11 COLON CANCER SCREENING: ICD-10-CM

## 2023-07-25 PROCEDURE — 82274 ASSAY TEST FOR BLOOD FECAL: CPT

## 2023-07-26 ENCOUNTER — OFFICE VISIT (OUTPATIENT)
Dept: FAMILY MEDICINE CLINIC | Facility: CLINIC | Age: 53
End: 2023-07-26
Payer: COMMERCIAL

## 2023-07-26 VITALS
RESPIRATION RATE: 18 BRPM | TEMPERATURE: 97 F | HEIGHT: 64 IN | HEART RATE: 90 BPM | SYSTOLIC BLOOD PRESSURE: 126 MMHG | BODY MASS INDEX: 38.58 KG/M2 | DIASTOLIC BLOOD PRESSURE: 68 MMHG | WEIGHT: 226 LBS | OXYGEN SATURATION: 98 %

## 2023-07-26 DIAGNOSIS — Z12.31 VISIT FOR SCREENING MAMMOGRAM: ICD-10-CM

## 2023-07-26 DIAGNOSIS — Z00.00 WELL FEMALE EXAM WITHOUT GYNECOLOGICAL EXAM: Primary | ICD-10-CM

## 2023-07-26 DIAGNOSIS — H53.8 BLURRING OF VISUAL IMAGE OF BOTH EYES: ICD-10-CM

## 2023-07-26 DIAGNOSIS — Z12.31 ENCOUNTER FOR SCREENING MAMMOGRAM FOR MALIGNANT NEOPLASM OF BREAST: ICD-10-CM

## 2023-07-26 DIAGNOSIS — Z13.228 SCREENING FOR ENDOCRINE, NUTRITIONAL, METABOLIC AND IMMUNITY DISORDER: ICD-10-CM

## 2023-07-26 DIAGNOSIS — H53.9 VISUAL DISTURBANCE: ICD-10-CM

## 2023-07-26 DIAGNOSIS — Z78.0 POST-MENOPAUSAL: ICD-10-CM

## 2023-07-26 DIAGNOSIS — Z13.21 SCREENING FOR ENDOCRINE, NUTRITIONAL, METABOLIC AND IMMUNITY DISORDER: ICD-10-CM

## 2023-07-26 DIAGNOSIS — Z13.0 SCREENING FOR ENDOCRINE, NUTRITIONAL, METABOLIC AND IMMUNITY DISORDER: ICD-10-CM

## 2023-07-26 DIAGNOSIS — E78.1 HYPERTRIGLYCERIDEMIA: ICD-10-CM

## 2023-07-26 DIAGNOSIS — R19.8 ABDOMINAL FULLNESS: ICD-10-CM

## 2023-07-26 DIAGNOSIS — E78.2 MIXED HYPERLIPIDEMIA: ICD-10-CM

## 2023-07-26 DIAGNOSIS — Z13.29 SCREENING FOR ENDOCRINE, NUTRITIONAL, METABOLIC AND IMMUNITY DISORDER: ICD-10-CM

## 2023-07-26 DIAGNOSIS — R73.9 HYPERGLYCEMIA: ICD-10-CM

## 2023-07-26 DIAGNOSIS — R10.32 LEFT LOWER QUADRANT ABDOMINAL PAIN: ICD-10-CM

## 2023-07-26 DIAGNOSIS — E66.9 OBESITY (BMI 35.0-39.9 WITHOUT COMORBIDITY): ICD-10-CM

## 2023-07-26 DIAGNOSIS — L40.9 PSORIASIS: ICD-10-CM

## 2023-07-26 LAB — HEMOCCULT STL QL: NEGATIVE

## 2023-07-26 PROCEDURE — 3074F SYST BP LT 130 MM HG: CPT | Performed by: FAMILY MEDICINE

## 2023-07-26 PROCEDURE — 3078F DIAST BP <80 MM HG: CPT | Performed by: FAMILY MEDICINE

## 2023-07-26 PROCEDURE — 99396 PREV VISIT EST AGE 40-64: CPT | Performed by: FAMILY MEDICINE

## 2023-07-26 PROCEDURE — 3008F BODY MASS INDEX DOCD: CPT | Performed by: FAMILY MEDICINE

## 2023-07-26 PROCEDURE — 99213 OFFICE O/P EST LOW 20 MIN: CPT | Performed by: FAMILY MEDICINE

## 2023-07-26 RX ORDER — MULTIVITAMIN WITH IRON
50 TABLET ORAL DAILY
COMMUNITY

## 2023-07-26 RX ORDER — APREMILAST 30 MG/1
30 TABLET, FILM COATED ORAL 2 TIMES DAILY
COMMUNITY

## 2023-07-26 RX ORDER — CALCIUM CARBONATE 500 MG/1
1 TABLET, CHEWABLE ORAL DAILY
COMMUNITY

## 2023-07-27 PROBLEM — R10.32 LEFT LOWER QUADRANT ABDOMINAL PAIN: Status: ACTIVE | Noted: 2023-07-27

## 2023-07-27 PROBLEM — N91.2 AMENORRHEA: Status: RESOLVED | Noted: 2019-07-29 | Resolved: 2023-07-27

## 2023-07-27 PROBLEM — R19.8 ABDOMINAL FULLNESS: Status: ACTIVE | Noted: 2023-07-27

## 2023-07-27 RX ORDER — ST. JOHN'S WORT 300 MG
CAPSULE ORAL
COMMUNITY
Start: 2023-07-27

## 2023-10-01 LAB
ABSOLUTE BASOPHILS: 48 CELLS/UL (ref 0–200)
ABSOLUTE EOSINOPHILS: 312 CELLS/UL (ref 15–500)
ABSOLUTE LYMPHOCYTES: 2520 CELLS/UL (ref 850–3900)
ABSOLUTE MONOCYTES: 528 CELLS/UL (ref 200–950)
ABSOLUTE NEUTROPHILS: 4592 CELLS/UL (ref 1500–7800)
ALBUMIN/GLOBULIN RATIO: 1.6 (CALC) (ref 1–2.5)
ALBUMIN: 4.2 G/DL (ref 3.6–5.1)
ALKALINE PHOSPHATASE: 90 U/L (ref 37–153)
ALT: 27 U/L (ref 6–29)
AST: 19 U/L (ref 10–35)
BASOPHILS: 0.6 %
BILIRUBIN, TOTAL: 0.5 MG/DL (ref 0.2–1.2)
BUN: 15 MG/DL (ref 7–25)
CALCIUM: 9.5 MG/DL (ref 8.6–10.4)
CARBON DIOXIDE: 27 MMOL/L (ref 20–32)
CHLORIDE: 104 MMOL/L (ref 98–110)
CHOL/HDLC RATIO: 3.6 (CALC)
CHOLESTEROL, TOTAL: 173 MG/DL
CREATININE: 0.91 MG/DL (ref 0.5–1.03)
EGFR: 75 ML/MIN/1.73M2
EOSINOPHILS: 3.9 %
GLOBULIN: 2.6 G/DL (CALC) (ref 1.9–3.7)
GLUCOSE: 162 MG/DL (ref 65–99)
HDL CHOLESTEROL: 48 MG/DL
HEMATOCRIT: 43.8 % (ref 35–45)
HEMOGLOBIN A1C: 6.7 % OF TOTAL HGB
HEMOGLOBIN: 14.4 G/DL (ref 11.7–15.5)
LDL-CHOLESTEROL: 92 MG/DL (CALC)
LYMPHOCYTES: 31.5 %
MCH: 29.3 PG (ref 27–33)
MCHC: 32.9 G/DL (ref 32–36)
MCV: 89 FL (ref 80–100)
MONOCYTES: 6.6 %
MPV: 11.2 FL (ref 7.5–12.5)
NEUTROPHILS: 57.4 %
NON-HDL CHOLESTEROL: 125 MG/DL (CALC)
PLATELET COUNT: 243 THOUSAND/UL (ref 140–400)
POTASSIUM: 4.8 MMOL/L (ref 3.5–5.3)
PROTEIN, TOTAL: 6.8 G/DL (ref 6.1–8.1)
RDW: 12.2 % (ref 11–15)
RED BLOOD CELL COUNT: 4.92 MILLION/UL (ref 3.8–5.1)
SODIUM: 138 MMOL/L (ref 135–146)
TRIGLYCERIDES: 252 MG/DL
TSH W/REFLEX TO FT4: 2.06 MIU/L
WHITE BLOOD CELL COUNT: 8 THOUSAND/UL (ref 3.8–10.8)

## 2023-10-03 NOTE — PROGRESS NOTES
Elevated triglycerides improving make sure you are taking Omega 3 take a total of 2-4 grams (2,000-4,000 mg) of EPA plus DHA. Limit SUGAR! Reduce all carbohydrates both simple and complex. Increase the fiber. Exercise needs to loose weight. Eat fish 2 times per week. [place], herring, sardines, tuna and mackerel are a few types of fish that are especially high in omega-3 fatty acids. Can eat fruits and vegetables and small frequent meals of healthy combinations of protein and carbohydrate (fruits/vegetables). Avoiding packaged/processed foods and alcohol. Normal white and red blood cell counts. Normal kidney and liver function testing. Elevated blood sugar testing indicating diabetes follow up in the office to discuss.   Normal lipid testing   Normal thyroid testing    Make appointment to discuss diabetes    Sincerely,   Maile Tucker PA-C

## 2024-07-10 ENCOUNTER — OFFICE VISIT (OUTPATIENT)
Dept: FAMILY MEDICINE CLINIC | Facility: CLINIC | Age: 54
End: 2024-07-10
Payer: COMMERCIAL

## 2024-07-10 VITALS
TEMPERATURE: 97 F | WEIGHT: 227 LBS | OXYGEN SATURATION: 97 % | HEART RATE: 90 BPM | RESPIRATION RATE: 16 BRPM | BODY MASS INDEX: 38.76 KG/M2 | SYSTOLIC BLOOD PRESSURE: 110 MMHG | HEIGHT: 64 IN | DIASTOLIC BLOOD PRESSURE: 64 MMHG

## 2024-07-10 DIAGNOSIS — Z87.19 HISTORY OF UMBILICAL HERNIA REPAIR: ICD-10-CM

## 2024-07-10 DIAGNOSIS — Z98.890 HISTORY OF UMBILICAL HERNIA REPAIR: ICD-10-CM

## 2024-07-10 DIAGNOSIS — Z78.0 POSTMENOPAUSAL: ICD-10-CM

## 2024-07-10 DIAGNOSIS — Z13.220 ENCOUNTER FOR LIPID SCREENING FOR CARDIOVASCULAR DISEASE: ICD-10-CM

## 2024-07-10 DIAGNOSIS — R10.31 INTERMITTENT RIGHT LOWER QUADRANT ABDOMINAL PAIN: ICD-10-CM

## 2024-07-10 DIAGNOSIS — Z13.6 ENCOUNTER FOR LIPID SCREENING FOR CARDIOVASCULAR DISEASE: ICD-10-CM

## 2024-07-10 DIAGNOSIS — Z00.00 WELL FEMALE EXAM WITHOUT GYNECOLOGICAL EXAM: Primary | ICD-10-CM

## 2024-07-10 DIAGNOSIS — L40.9 PSORIASIS: ICD-10-CM

## 2024-07-10 DIAGNOSIS — Z13.29 SCREENING FOR ENDOCRINE, NUTRITIONAL, METABOLIC AND IMMUNITY DISORDER: ICD-10-CM

## 2024-07-10 DIAGNOSIS — E78.2 MIXED HYPERLIPIDEMIA: ICD-10-CM

## 2024-07-10 DIAGNOSIS — E11.9 NEW ONSET TYPE 2 DIABETES MELLITUS (HCC): ICD-10-CM

## 2024-07-10 DIAGNOSIS — Z12.11 COLON CANCER SCREENING: ICD-10-CM

## 2024-07-10 DIAGNOSIS — H53.8 BLURRED VISION: ICD-10-CM

## 2024-07-10 DIAGNOSIS — Z13.0 SCREENING FOR ENDOCRINE, NUTRITIONAL, METABOLIC AND IMMUNITY DISORDER: ICD-10-CM

## 2024-07-10 DIAGNOSIS — Z12.31 VISIT FOR SCREENING MAMMOGRAM: ICD-10-CM

## 2024-07-10 DIAGNOSIS — E66.1 CLASS 2 DRUG-INDUCED OBESITY WITH SERIOUS COMORBIDITY AND BODY MASS INDEX (BMI) OF 38.0 TO 38.9 IN ADULT: ICD-10-CM

## 2024-07-10 DIAGNOSIS — Z23 NEED FOR VACCINATION: ICD-10-CM

## 2024-07-10 DIAGNOSIS — Z13.228 SCREENING FOR ENDOCRINE, NUTRITIONAL, METABOLIC AND IMMUNITY DISORDER: ICD-10-CM

## 2024-07-10 DIAGNOSIS — B35.1 TOENAIL FUNGUS: ICD-10-CM

## 2024-07-10 DIAGNOSIS — Z13.21 SCREENING FOR ENDOCRINE, NUTRITIONAL, METABOLIC AND IMMUNITY DISORDER: ICD-10-CM

## 2024-07-10 PROBLEM — R19.8 ABDOMINAL FULLNESS: Status: RESOLVED | Noted: 2023-07-27 | Resolved: 2024-07-10

## 2024-07-10 PROBLEM — E66.812 CLASS 2 DRUG-INDUCED OBESITY WITH SERIOUS COMORBIDITY AND BODY MASS INDEX (BMI) OF 38.0 TO 38.9 IN ADULT: Status: ACTIVE | Noted: 2024-07-10

## 2024-07-10 PROBLEM — E78.1 HYPERTRIGLYCERIDEMIA: Status: RESOLVED | Noted: 2020-07-31 | Resolved: 2024-07-10

## 2024-07-10 PROBLEM — R10.32 LEFT LOWER QUADRANT ABDOMINAL PAIN: Status: RESOLVED | Noted: 2023-07-27 | Resolved: 2024-07-10

## 2024-07-10 LAB
CREAT UR-SCNC: 37.9 MG/DL
HEMOGLOBIN A1C: 7 % (ref 4.3–5.6)
MICROALBUMIN UR-MCNC: <0.5 MG/DL

## 2024-07-10 PROCEDURE — 82570 ASSAY OF URINE CREATININE: CPT | Performed by: FAMILY MEDICINE

## 2024-07-10 PROCEDURE — 82043 UR ALBUMIN QUANTITATIVE: CPT | Performed by: FAMILY MEDICINE

## 2024-07-10 RX ORDER — MELATONIN 10 MG
CAPSULE ORAL
COMMUNITY

## 2024-07-10 RX ORDER — MAGNESIUM OXIDE 400 MG/1
400 TABLET ORAL DAILY
COMMUNITY

## 2024-07-10 RX ORDER — TIRZEPATIDE 2.5 MG/.5ML
2.5 INJECTION, SOLUTION SUBCUTANEOUS WEEKLY
Qty: 2 ML | Refills: 0 | Status: SHIPPED | OUTPATIENT
Start: 2024-07-10 | End: 2024-08-01

## 2024-07-10 RX ORDER — BLOOD SUGAR DIAGNOSTIC
STRIP MISCELLANEOUS
Qty: 100 STRIP | Refills: 5 | Status: SHIPPED | OUTPATIENT
Start: 2024-07-10

## 2024-07-10 RX ORDER — LANCETS
EACH MISCELLANEOUS
Qty: 100 EACH | Refills: 5 | Status: SHIPPED | OUTPATIENT
Start: 2024-07-10

## 2024-07-10 RX ORDER — BLOOD-GLUCOSE METER
EACH MISCELLANEOUS
Qty: 1 KIT | Refills: 0 | Status: SHIPPED | OUTPATIENT
Start: 2024-07-10

## 2024-07-10 RX ORDER — ATORVASTATIN CALCIUM 10 MG/1
10 TABLET, FILM COATED ORAL NIGHTLY
Qty: 90 TABLET | Refills: 1 | Status: SHIPPED | OUTPATIENT
Start: 2024-07-10

## 2024-07-10 NOTE — PROGRESS NOTES
HPI:   Oneyda Humphreys is a 54 year old female who presents for a complete physical exam discussion on diabetes, intermittent abdominal pain and hyperlipidemia.     Tests ordered last year not completed, shingles vaccine #2 ultrasound abdomen, ultrasound pelvis, bone density and mammogram      --- Intermittent abdominal pain   Discussed last year at the physical no testing was completed.  Now she believes it could be related to intestines feels better after she has a bowel movement.  No symptoms of obstruction bowel movements are regular and normal consistency and color.  Does get intermittent right lower abdominal pain.  History of umbilical hernia repair 2016.  No nausea, vomiting or diarrhea  Is menopausal having hot flashes no bloating symptoms  No abdominal pain today.  Is concerned that she may have another hernia though has not felt any bulging.  Is also worried about the mesh from the prior hernia surgery.    ---New onset diabetes/obesity BMI 38  Hemoglobin A1c 9/30/2023 6.7 patient never followed up to discuss medication or management  Hemoglobin A1c today 7.0  Denies any numbness, tingling or pain to the feet  Blurred vision did have a eye exam that checked retina 12/2023 that she states was normal.  Would like to go to an ophthalmologist for the blurred vision and get a retinal exam request referral to Dr. Branham in [place]  Needs diabetic eye exam  Present symptoms denies any increased thirst,   Present symptoms positive for  increased urination and blurred vision  FH diabetes father mother  Patient is interested in weight loss as well as treatment for diabetes  Has intermittent GERD uses tums when she gets symptoms with red sauce,  mexican or chili will try Pepcid OTC for symptoms does get relief when she uses Tums  Denies constipation could improve water intake and needs to improve diet no exercise presently  No family history or personal history of medullary thyroid cancer no FH MEN,  no  personal history or family history of pancreatitis.   Has intermittent GERD symptoms or bowel movement issues.      Hyperlipidemia  Taking Lipitor 10 mg daily tolerates well no side effects did not fill it for herself this year was taking her 's states she will except a prescription for herself  Ultrafast CT of the heart 0 calcifications 7/9/2020    Well adult exam  Dermatologist Dr. Oliver psoriasis on Otezla   Colon cancer screening normal FIT colon cancer screening normal 7/25/2023 refuses colonoscopy bowel movements are normal    Immunizations shingles #2 today;   COVID vaccine 3 shots last 15/24/22; Tdap 7/31/2020  Dental exams yes  Eye exams  vision distortion saw optomitrist 12/2023 was told to see ophthalmologist did not go yet the retinal exam was normal  PHQ9   @ a 0   Sleep Apnea  None witnessed minimal snoring  states no apnea  Exercise none  Family history see below  MGF lung cancer, PGM lung cancer; Dad stents/DM  No AMI no bypass  Smoking never  ALc 1 wekly   Bone density not completed  UFCT heart 0 calcification score 7/9/20  Abnormal pap never abnormal last one 8/12/2022 normal  Sexually active not often  has Parkinson's   Last colonoscopy no FH colon cancer refuses colonoscopy did fit test 7/25/2024 negative  Last mammogram 8/13/2022 no FH breast cancer or ovarian cancer    Sleep or emotional difficulty good   LMP 2012  STD history never  8/12/2022 prior labs elevated ALT 68, triglycerides 324 LDL 80 and hemoglobin A1c 6.2 due for blood work repeat    PMH psoriasis new onset diabetes  SURG umbilical hernia repair and endometrial ablation  FH Father carotid artery stenosis had stents, basal cell carcinoma, hypertension, diabetes and hyperlipidemia.  Mother hypertension, glaucoma, diabetes, hyperlipidemia, apnea, gout.  Paternal grandfather liver cirrhosis, paternal grandmother lung cancer, 2 siblings sister and brother alive and well.  SH She works as a      has Parkinson's and now bladder cancer, alcohol 2/week, no smoking, illicit drug or regular exercise.  2 cups of coffee a day.    Results for orders placed or performed in visit on 07/10/24   HEMOGLOBIN A1C   Result Value Ref Range    HEMOGLOBIN A1C 7.0 (A) 4.3 - 5.6 %    Cartridge Lot# [id number] Numeric    Cartridge Expiration Date 12/04/2025 Date       Wt Readings from Last 6 Encounters:   07/10/24 227 lb (103 kg)   07/26/23 226 lb (102.5 kg)   08/12/22 227 lb (103 kg)   08/06/21 221 lb (100.2 kg)   07/31/20 224 lb (101.6 kg)   07/29/19 218 lb (98.9 kg)     Body mass index is 38.96 kg/m².       Results for orders placed or performed in visit on 07/10/24   HEMOGLOBIN A1C   Result Value Ref Range    HEMOGLOBIN A1C 7.0 (A) 4.3 - 5.6 %    Cartridge Lot# [id number] Numeric    Cartridge Expiration Date 12/04/2025 Date        Current Outpatient Medications   Medication Sig Dispense Refill    Coenzyme Q10 (COQ10 OR) Take by mouth.      magnesium oxide 400 MG Oral Tab Take 1 tablet (400 mg total) by mouth daily.      Melatonin 10 MG Oral Cap Take by mouth.      NON FORMULARY Source Naturals Hot Flash      Glucose Blood (ONETOUCH ULTRA) In Vitro Strip Check blood sugars in the morning fasting and 3 times a week 2 hours after main meal. 100 strip 5    Blood Glucose Monitoring Suppl (ONETOUCH ULTRA 2) w/Device Does not apply Kit Give appropriate One Touch strips with device 1 kit 0    OneTouch UltraSoft Lancets Does not apply Misc Check blood sugars fasting daily. 100 each 5    atorvastatin 10 MG Oral Tab Take 1 tablet (10 mg total) by mouth nightly. 90 tablet 1    Tirzepatide (MOUNJARO) 2.5 MG/0.5ML Subcutaneous Solution Pen-injector Inject 2.5 mg into the skin once a week for 4 doses. 2 mL 0    St Johns Wort (ST JOHNS WORT MOOD RELAXER) 300 MG Oral Cap       OMEGA 3-6-9 FATTY ACIDS OR Take by mouth.      calcium carbonate 500 MG Oral Chew Tab Chew 1 tablet (500 mg total) by mouth daily.      Apremilast (OTEZLA) 30  MG Oral Tab Take 30 mg by mouth in the morning and 30 mg before bedtime.      Loratadine 5 MG Oral Tablet Dispersible Take 1 tablet by mouth daily as needed.      aspirin 81 MG Oral Tab EC Take 1 tablet (81 mg total) by mouth daily.      vitamin B-12 50 MCG Oral Tab Take 1 tablet (50 mcg total) by mouth daily. (Patient not taking: Reported on 7/10/2024)      Misc Natural Products (TURMERIC CURCUMIN) Oral Cap Take 1 capsule by mouth daily. (Patient not taking: Reported on 7/10/2024)        Past Medical History:    Psoriasis      Past Surgical History:   Procedure Laterality Date    Ablation  2012    [place]    Hernia surgery  06/2016    [facility]    Mammogram, both breasts  2016    Screening pap smear by phys  2016      Family History   Problem Relation Age of Onset    Basal Cell Father     Hypertension Father     Diabetes Father     High Cholesterol Father     Arthritis Father         Rheumatoid Arthritis    Other (Carotid Stents) Father     Other (Gout) Father     Hypertension Mother     Glaucoma Mother     Diabetes Mother     High Cholesterol Mother     Other (Apnea) Mother     Other (Gout) Mother     Other (Cirrhosis of the liver) Paternal Grandfather         Alc.    Cancer Paternal Grandmother         Lung Cancer    No Known Problems Sister     No Known Problems Brother     Cancer Maternal Grandfather         Lung Cancer      Social History:   Social History     Socioeconomic History    Marital status:    Occupational History    Occupation:    Tobacco Use    Smoking status: Never    Smokeless tobacco: Never   Vaping Use    Vaping status: Never Used   Substance and Sexual Activity    Alcohol use: Yes     Alcohol/week: 2.0 standard drinks of alcohol     Types: 2 Standard drinks or equivalent per week    Drug use: Never   Other Topics Concern     Service No    Blood Transfusions No    Caffeine Concern Yes     Comment: 1-2 cups of coffee daily and 1-2 cans of pop or glasses  of iced tea weekly    Occupational Exposure No    Hobby Hazards No    Sleep Concern No    Stress Concern No    Weight Concern No    Special Diet No    Back Care No    Exercise Yes     Comment: exercises 0-1 times week and occasional walks    Bike Helmet No    Seat Belt Yes    Self-Exams No     Occ: . : yes. Children: no.   Exercise: none.  Diet: watches minimally     REVIEW OF SYSTEMS:   GENERAL: denies fevers, weakness, trouble sleeping.  Weight issues BMI 38  SKIN: denies any unusual skin lesions or rashes  EYES: Intermittent blurred vision  HEENT: denies upper respiratory symptoms  LUNGS: denies cough or shortness of breath with exertion  CHEST:  denies breast changes or pain  CARDIOVASCULAR: denies chest pain or tightness on exertion: no edema  VASCULAR: denies leg cramps  GI: denies abdominal pain, bowel movement changes, blood in stool  : denies urinary problems, vaginal discharge or discomfort,  periods not for several years presently not sexually active with  complains of lower abdominal pressure right greater than left  MUSCULOSKELETAL: denies joint pain or stiffness  NEURO: denies headaches, tingling or dizziness  PSYCHE: denies depression or anxiety  HEMATOLOGIC: denies bleeding abnormalities  ENDOCRINE: denies temperature intolerance, polyuria, or excessive sweating.  LYMPHATICS: denies swollen glands      EXAM:   /64   Pulse 90   Temp 97.3 °F (36.3 °C) (Temporal)   Resp 16   Ht 5' 4\" (1.626 m)   Wt 227 lb (103 kg)   SpO2 97%   BMI 38.96 kg/m²   Body mass index is 38.96 kg/m².   GENERAL: well developed, well nourished and in no apparent distress  SKIN: Psoriasis plaque present  lower legs.    HEENT: atraumatic, normocephalic,ears, nose and throat are normal  EYES: PERRLA, EOMI, sclera, conjunctiva are clear  NECK: supple,no adenopathy,no carotid bruits  CHEST: no chest tenderness  BREAST: symmetrical, no suspicious mass, no nipple dimpling or discharge.  LUNGS:  clear to auscultation bilateral, no rales, rhonchi or wheezing  CARDIO: RRR without murmur normal S1S2  ABD:  normal bowel sounds,soft, non tender, fullness on the lower abdomen below the umbilicus with no reproducible tenderness to the lower abdomen no guarding, rigidity or rebound no HSM   : Patient defers  exam today last done 2022 will do  exam next year  MUSCULOSKELETAL: gait elio,l no gross M/S defect.  EXTREMITIES: no clubbing, cyanosis, or edema  NEURO: oriented times three, cranial nerves are grossly intact, no gross motor or sensory deficit.  Bilateral barefoot skin diabetic exam is abnormal with dystrophic nails and/or dry skin and bilateral bilateral feet toenail fungus throughout, significant dryness to the plantar aspect of the foot bilateral multiple calluses present bilaterally.  Normal monofilament testing  ASSESSMENT AND PLAN:   Oneyda Humphreys is a 54 year old female who presents for a complete physical exam discussion on diabetes, obesity and hyperlipidemia  Encounter Diagnoses   Name Primary?    Well female exam without gynecological exam Yes    New onset type 2 diabetes mellitus (HCC)     Toenail fungus     Blurred vision     Class 2 drug-induced obesity with serious comorbidity and body mass index (BMI) of 38.0 to 38.9 in adult     Mixed hyperlipidemia     Intermittent right lower quadrant abdominal pain     History of umbilical hernia repair     Psoriasis     Encounter for lipid screening for cardiovascular disease     Screening for endocrine, nutritional, metabolic and immunity disorder     Visit for screening mammogram     Colon cancer screening     Need for vaccination     Postmenopausal        Orders Placed This Encounter   Procedures    CBC With Differential With Platelet    Comp Metabolic Panel (14)    TSH and Free T4    Lipid Panel    Microalb/Creat Ratio, Random Urine    HEMOGLOBIN A1C    INSURE FECAL GLOBIN by IMMUNOASSAY [[id number]] [Q]    Zoster Vac (Shingrix) in  office vaccine- Non-Medicare or Medicare with ABN       Meds & Refills for this Visit:  Requested Prescriptions     Signed Prescriptions Disp Refills    Glucose Blood (ONETOUCH ULTRA) In Vitro Strip 100 strip 5     Sig: Check blood sugars in the morning fasting and 3 times a week 2 hours after main meal.    Blood Glucose Monitoring Suppl (ONETOUCH ULTRA 2) w/Device Does not apply Kit 1 kit 0     Sig: Give appropriate One Touch strips with device    OneTouch UltraSoft Lancets Does not apply Misc 100 each 5     Sig: Check blood sugars fasting daily.    atorvastatin 10 MG Oral Tab 90 tablet 1     Sig: Take 1 tablet (10 mg total) by mouth nightly.    Tirzepatide (MOUNJARO) 2.5 MG/0.5ML Subcutaneous Solution Pen-injector 2 mL 0     Sig: Inject 2.5 mg into the skin once a week for 4 doses.       Imaging & Consults:  OP REFERRAL DIABETES FULL ED 10 HRS GROUP/IND  OPHTHALMOLOGY - INTERNAL  ZOSTER VACC RECOMBINANT IM NJX  XR DEXA BONE DENSITOMETRY (CPT=77080)  KITA SAMREEN 2D+3D SCREENING BILAT (CPT=77067/03876)  CT ABDOMEN+PELVIS(CONTRAST ONLY)(CPT=74177)  1. Well female exam without routine gynecological exam.    Self breast exam explained. Health maintenance guidance given including vision and dental exams, vitamin D 1,000 iu daily with calcium 500 mg daily.  Lifestyle guidance provided recommended low fat diet and aerobic exercise 30 minutes 3-4 times weekly.  Get shingles vaccine #2 .   Continue with gynecologist yearly on Otezla  Continue with yearly fit test discussed colonoscopy patient defers    2. New onset type 2 diabetes mellitus (HCC)  Foot exams daily checking for any openings to the skin or signs of ulcers.  Encouraged appropriate footcare and possible podiatrist if difficult maintaining care for feet  - Comp Metabolic Panel (14)  - Lipid Panel  - Microalb/Creat Ratio, Random Urine  - HEMOGLOBIN A1C  - EDUCATION -OP REFERRAL  DIABETES- FULL ED 10 HRS GRP/IND  - Glucose Blood (ONETOUCH ULTRA) In Vitro Strip; Check  blood sugars in the morning fasting and 3 times a week 2 hours after main meal.  Dispense: 100 strip; Refill: 5  - Blood Glucose Monitoring Suppl (ONETOUCH ULTRA 2) w/Device Does not apply Kit; Give appropriate One Touch strips with device  Dispense: 1 kit; Refill: 0  - OneTouch UltraSoft Lancets Does not apply Misc; Check blood sugars fasting daily.  Dispense: 100 each; Refill: 5  - atorvastatin 10 MG Oral Tab; Take 1 tablet (10 mg total) by mouth nightly.  Dispense: 90 tablet; Refill: 1  - Tirzepatide (MOUNJARO) 2.5 MG/0.5ML Subcutaneous Solution Pen-injector; Inject 2.5 mg into the skin once a week for 4 doses.  Dispense: 2 mL; Refill: 0  - Ophthalmology Referral - In Network  No family history or personal history of medullary thyroid cancer no FH MEN,  no personal history or family history of pancreatitis.  No present GERD symptoms or bowel movement issues.  If needed use over-the-counter MiraLAX, stool softener and increase fiber if constipation symptoms.  Continue with trying to maintain water intake at least at 62 ounces per day  Small frequent meals with adequate protein goal would be 60 g/day and approximately 1600 chanda/day  Exercise try fit on jules ideally 30 minutes/day of aerobic exercise  Discussed increased risk for gallbladder disease, GERD, constipation with medication.    If needs anesthesia no medication 1 week before anesthesia  Medication can be increased monthly if tolerated and needed up to the optimal dose.  Follow-up in 3 months    3. Toenail fungus  Consider podiatrist evaluation    4. Blurred vision  Possibly due to diabetes needs good retinal exam  - Ophthalmology Referral - In Network    5. Class 2 drug-induced obesity with serious comorbidity and body mass index (BMI) of 38.0 to 38.9 in adult  Weight loss discussed patient should start exercising daily for 30-40 minutes also reducing all carbohydrates both simple and complex.  Can eat fruits and vegetables and small frequent meals of  healthy combinations of protein and carbohydrate.  Avoiding packaged/processed.       6. Mixed hyperlipidemia  Low saturated fat diet considering hello fresh for food  - atorvastatin 10 MG Oral Tab; Take 1 tablet (10 mg total) by mouth nightly.  Dispense: 90 tablet; Refill: 1    7. Intermittent right lower quadrant abdominal pain  Pain over the past year on and off possibly hernia  - CT ABDOMEN+PELVIS(CONTRAST ONLY)(CPT=74177); Future    8. History of umbilical hernia repair  - CT ABDOMEN+PELVIS(CONTRAST ONLY)(CPT=74177); Future    9. Psoriasis  Continue with follow-ups with dermatology    10. Encounter for lipid screening for cardiovascular disease  Lipid panel    11. Screening for endocrine, nutritional, metabolic and immunity disorder  - CBC With Differential With Platelet  - Comp Metabolic Panel (14)  - TSH and Free T4    12. Visit for screening mammogram  - KITA SAMREEN 2D+3D SCREENING BILAT (CPT=77067/01102); Future    13. Colon cancer screening  - INSURE FECAL GLOBIN by IMMUNOASSAY [[id number]] [Q]    14. Need for vaccination  - Zoster Vac (Shingrix) in office vaccine- Non-Medicare or Medicare with ABN    15. Postmenopausal  - XR DEXA BONE DENSITOMETRY (CPT=77080); Future    .     The patient indicates understanding of these issues and agrees to the plan.  The patient is asked to return  follow-up 3 months for follow-up on diabetes hemoglobin A1c in the office

## 2024-07-12 ENCOUNTER — PATIENT MESSAGE (OUTPATIENT)
Dept: FAMILY MEDICINE CLINIC | Facility: CLINIC | Age: 54
End: 2024-07-12

## 2024-07-12 ENCOUNTER — TELEPHONE (OUTPATIENT)
Dept: FAMILY MEDICINE CLINIC | Facility: CLINIC | Age: 54
End: 2024-07-12

## 2024-07-15 NOTE — TELEPHONE ENCOUNTER
From: Oneyda uHmphreys  To: Ольга Edwards  Sent: 7/12/2024 2:09 PM CDT  Subject: Mounjaro    Hello... My insurance will only cover generic oral medications. Do you know if the Mounjaro company has a program to help cover the cost for people with bad insurance? If not, I will need a different prescription.    Thanks...have a good weekend...JIMMY

## 2024-07-16 ENCOUNTER — TELEPHONE (OUTPATIENT)
Dept: FAMILY MEDICINE CLINIC | Facility: CLINIC | Age: 54
End: 2024-07-16

## 2024-07-16 RX ORDER — ORAL SEMAGLUTIDE 3 MG/1
3 TABLET ORAL EVERY MORNING
Qty: 30 TABLET | Refills: 0 | Status: SHIPPED | OUTPATIENT
Start: 2024-07-16 | End: 2024-08-15

## 2024-07-16 NOTE — TELEPHONE ENCOUNTER
Attempted Prior Authorization for Mounjaro on Cover My Meds but was unable to submit. Called [employer] at [phone] and spoke w/ Yeni.   Injectables are excluded from this patient's plan. Please advise, thanks.

## 2024-07-16 NOTE — TELEPHONE ENCOUNTER
Instead of injectable am sending over an oral medication to see if that will be covered its called Rybelsus is a 3 mg tablet you take 1 every morning 30 minutes before food, drink or medication and you take it with 4 ounces of water.  Similar side effects as the injectable and benefits are still weight loss and good diabetic control.  Hopefully this will be covered.  If not then we will need to go to metformin for diabetes.      Sincerely,   Ольга Edwards PA-C

## 2024-07-31 ENCOUNTER — PATIENT MESSAGE (OUTPATIENT)
Dept: FAMILY MEDICINE CLINIC | Facility: CLINIC | Age: 54
End: 2024-07-31

## 2024-08-01 NOTE — TELEPHONE ENCOUNTER
From: Oneyda Humphreys  To: Ольга Edwards  Sent: 7/31/2024 10:05 PM CDT  Subject: Mounjaro    I've been working on getting Mounjaro! (The Rybelsus was not covered, over $600)... So on today's trip to the pharmacy, I found out that you don't have in the prescription that I need it for type 2 diabetes, the pharmacist said that the ICD-10 code should be on the prescription... I'm hoping you understand what they are asking for, so I can get help with the payments thru [employer]. I'm hoping to get this straightened out so I can start taking it Friday,    Thanks,  JIMMY

## 2024-08-02 DIAGNOSIS — E11.9 NEW ONSET TYPE 2 DIABETES MELLITUS (HCC): ICD-10-CM

## 2024-08-02 RX ORDER — TIRZEPATIDE 2.5 MG/.5ML
2.5 INJECTION, SOLUTION SUBCUTANEOUS WEEKLY
Qty: 2 ML | Refills: 0 | Status: SHIPPED | OUTPATIENT
Start: 2024-08-02 | End: 2024-08-24

## 2024-08-02 NOTE — TELEPHONE ENCOUNTER
Patient called to request we resend the mounjaro script. She is requesting help directly from an [employer] program that needs the IDC-10 code on the script. She is aware her insurance will not cover. She is unable to  the rybelsus as her cost is over $600. She will be working directly w/[employer] and will let us know if they cover it or if she needs something else like metformin script but she wants to try this first.

## 2024-08-02 NOTE — PROGRESS NOTES
Patient called to request we resend the mounjaro script. She is requesting help directly from an [employer] program that needs the IDC-10 code on the script. She is aware her insurance will not cover. She is unable to  the rybelsus as her cost is over $600. She will be working directly w/[email or web address] and will let us know if they cover it or if she needs something else like metformin script but she wants to try this first.

## 2024-08-03 DIAGNOSIS — E11.9 NEW ONSET TYPE 2 DIABETES MELLITUS (HCC): Primary | ICD-10-CM

## 2024-08-03 LAB
ABSOLUTE BASOPHILS: 53 CELLS/UL (ref 0–200)
ABSOLUTE EOSINOPHILS: 220 CELLS/UL (ref 15–500)
ABSOLUTE LYMPHOCYTES: 3086 CELLS/UL (ref 850–3900)
ABSOLUTE MONOCYTES: 426 CELLS/UL (ref 200–950)
ABSOLUTE NEUTROPHILS: 3815 CELLS/UL (ref 1500–7800)
ALBUMIN/GLOBULIN RATIO: 1.7 (CALC) (ref 1–2.5)
ALBUMIN: 4.4 G/DL (ref 3.6–5.1)
ALKALINE PHOSPHATASE: 99 U/L (ref 37–153)
ALT: 28 U/L (ref 6–29)
AST: 19 U/L (ref 10–35)
BASOPHILS: 0.7 %
BILIRUBIN, TOTAL: 0.5 MG/DL (ref 0.2–1.2)
BUN: 10 MG/DL (ref 7–25)
CALCIUM: 9.6 MG/DL (ref 8.6–10.4)
CARBON DIOXIDE: 25 MMOL/L (ref 20–32)
CHLORIDE: 105 MMOL/L (ref 98–110)
CHOL/HDLC RATIO: 3.5 (CALC)
CHOLESTEROL, TOTAL: 177 MG/DL
CREATININE: 0.78 MG/DL (ref 0.5–1.03)
EGFR: 90 ML/MIN/1.73M2
EOSINOPHILS: 2.9 %
GLOBULIN: 2.6 G/DL (CALC) (ref 1.9–3.7)
GLUCOSE: 117 MG/DL (ref 65–99)
HDL CHOLESTEROL: 50 MG/DL
HEMATOCRIT: 42.7 % (ref 35–45)
HEMOGLOBIN: 14.3 G/DL (ref 11.7–15.5)
LDL-CHOLESTEROL: 83 MG/DL (CALC)
LYMPHOCYTES: 40.6 %
MCH: 29.2 PG (ref 27–33)
MCHC: 33.5 G/DL (ref 32–36)
MCV: 87.3 FL (ref 80–100)
MONOCYTES: 5.6 %
MPV: 11.8 FL (ref 7.5–12.5)
NEUTROPHILS: 50.2 %
NON-HDL CHOLESTEROL: 127 MG/DL (CALC)
PLATELET COUNT: 242 THOUSAND/UL (ref 140–400)
POTASSIUM: 4.2 MMOL/L (ref 3.5–5.3)
PROTEIN, TOTAL: 7 G/DL (ref 6.1–8.1)
RDW: 12.6 % (ref 11–15)
RED BLOOD CELL COUNT: 4.89 MILLION/UL (ref 3.8–5.1)
SODIUM: 140 MMOL/L (ref 135–146)
T4, FREE: 1 NG/DL (ref 0.8–1.8)
TRIGLYCERIDES: 365 MG/DL
TSH: 2.18 MIU/L
WHITE BLOOD CELL COUNT: 7.6 THOUSAND/UL (ref 3.8–10.8)

## 2024-08-26 ENCOUNTER — PATIENT MESSAGE (OUTPATIENT)
Dept: FAMILY MEDICINE CLINIC | Facility: CLINIC | Age: 54
End: 2024-08-26

## 2024-08-26 NOTE — TELEPHONE ENCOUNTER
From: Oneyda Humphreys  To: Ольга Edwards  Sent: 8/26/2024 3:44 PM CDT  Subject: Mounjaro    Good Afternoon    I did the last of the 4 injections on Friday... The prescription has no refill and I'm thinking that's because I may have been getting a different dose? I stopped at the pharmacy and they do not have another prescription for me, so I'm checking in to see what I should be doing next?    Thanks,  JIMMY

## 2024-08-27 RX ORDER — TIRZEPATIDE 5 MG/.5ML
5 INJECTION, SOLUTION SUBCUTANEOUS WEEKLY
Qty: 2 ML | Refills: 0 | Status: SHIPPED | OUTPATIENT
Start: 2024-08-27 | End: 2024-09-26

## 2024-08-30 DIAGNOSIS — E11.9 NEW ONSET TYPE 2 DIABETES MELLITUS (HCC): ICD-10-CM

## 2024-08-30 RX ORDER — TIRZEPATIDE 2.5 MG/.5ML
2.5 INJECTION, SOLUTION SUBCUTANEOUS WEEKLY
Qty: 2 ML | Refills: 0 | OUTPATIENT
Start: 2024-08-30

## 2024-10-28 ENCOUNTER — TELEPHONE (OUTPATIENT)
Dept: FAMILY MEDICINE CLINIC | Facility: CLINIC | Age: 54
End: 2024-10-28

## 2024-10-28 RX ORDER — TIRZEPATIDE 5 MG/.5ML
5 INJECTION, SOLUTION SUBCUTANEOUS WEEKLY
Qty: 2 ML | Refills: 0 | Status: SHIPPED | OUTPATIENT
Start: 2024-10-28

## 2024-10-28 NOTE — TELEPHONE ENCOUNTER
Patient walked in office and was asking to have the mounjaro 5mg dose resent to [facility].  She said that she never picked up this script back in August as she was told by the pharmacy that it was never sent?    Script resent to [facility].

## 2024-12-30 RX ORDER — TIRZEPATIDE 7.5 MG/.5ML
7.5 INJECTION, SOLUTION SUBCUTANEOUS
Qty: 2 ML | Refills: 0 | Status: SHIPPED | OUTPATIENT
Start: 2024-12-30

## 2024-12-31 ENCOUNTER — MED REC SCAN ONLY (OUTPATIENT)
Dept: FAMILY MEDICINE CLINIC | Facility: CLINIC | Age: 54
End: 2024-12-31

## 2025-01-02 ENCOUNTER — TELEPHONE (OUTPATIENT)
Dept: FAMILY MEDICINE CLINIC | Facility: CLINIC | Age: 55
End: 2025-01-02

## 2025-01-03 NOTE — TELEPHONE ENCOUNTER
[email or web address] for Mounjaro 7.5MG       
Prior Authorization initiated pending questions  
no concerns

## 2025-01-27 RX ORDER — TIRZEPATIDE 7.5 MG/.5ML
7.5 INJECTION, SOLUTION SUBCUTANEOUS
Qty: 2 ML | Refills: 0 | Status: SHIPPED | OUTPATIENT
Start: 2025-01-27 | End: 2025-01-30

## 2025-01-30 NOTE — TELEPHONE ENCOUNTER
Called [employer] at [phone] to initiate a prior authorization for Mounjaro 7.5mg and according to them her plan does not cover  any injectables and she is a generic given program only          Last OV July 2024. Called patient and left msg asking her to call office to schedule appointment with Ольга Edwards PA-C and mentioned that provider now has new hours

## 2025-02-13 ENCOUNTER — TELEPHONE (OUTPATIENT)
Dept: FAMILY MEDICINE CLINIC | Facility: CLINIC | Age: 55
End: 2025-02-13

## 2025-02-13 NOTE — TELEPHONE ENCOUNTER
The patient called to report that her copay for Mounjaro has increased from $550 to $650. She no longer wishes to continue with Mounjaro and is requesting a prescription for a generic medication, which she believes starts with the letter \"M.\"    The patient plans to call back with the name of the generic medication and the preferred cost-effective pharmacy.

## 2025-03-25 DIAGNOSIS — E11.9 NEW ONSET TYPE 2 DIABETES MELLITUS (HCC): ICD-10-CM

## 2025-03-25 DIAGNOSIS — E78.2 MIXED HYPERLIPIDEMIA: ICD-10-CM

## 2025-03-25 RX ORDER — ATORVASTATIN CALCIUM 10 MG/1
10 TABLET, FILM COATED ORAL NIGHTLY
Qty: 90 TABLET | Refills: 0 | Status: SHIPPED | OUTPATIENT
Start: 2025-03-25

## 2025-06-18 ENCOUNTER — OFFICE VISIT (OUTPATIENT)
Dept: FAMILY MEDICINE CLINIC | Facility: CLINIC | Age: 55
End: 2025-06-18
Payer: COMMERCIAL

## 2025-06-18 VITALS
BODY MASS INDEX: 38.07 KG/M2 | SYSTOLIC BLOOD PRESSURE: 128 MMHG | RESPIRATION RATE: 16 BRPM | TEMPERATURE: 98 F | HEART RATE: 96 BPM | WEIGHT: 223 LBS | OXYGEN SATURATION: 98 % | DIASTOLIC BLOOD PRESSURE: 76 MMHG | HEIGHT: 64 IN

## 2025-06-18 DIAGNOSIS — Z13.29 SCREENING FOR ENDOCRINE, NUTRITIONAL, METABOLIC AND IMMUNITY DISORDER: ICD-10-CM

## 2025-06-18 DIAGNOSIS — E78.2 MIXED HYPERLIPIDEMIA: ICD-10-CM

## 2025-06-18 DIAGNOSIS — B35.1 TOENAIL FUNGUS: ICD-10-CM

## 2025-06-18 DIAGNOSIS — E11.9 CONTROLLED TYPE 2 DIABETES MELLITUS WITHOUT COMPLICATION, WITHOUT LONG-TERM CURRENT USE OF INSULIN (HCC): Primary | ICD-10-CM

## 2025-06-18 DIAGNOSIS — Z87.19 HISTORY OF UMBILICAL HERNIA REPAIR: ICD-10-CM

## 2025-06-18 DIAGNOSIS — Z98.890 HISTORY OF UMBILICAL HERNIA REPAIR: ICD-10-CM

## 2025-06-18 DIAGNOSIS — R10.31 INTERMITTENT RIGHT LOWER QUADRANT ABDOMINAL PAIN: ICD-10-CM

## 2025-06-18 DIAGNOSIS — Z78.0 POSTMENOPAUSAL: ICD-10-CM

## 2025-06-18 DIAGNOSIS — Z13.21 SCREENING FOR ENDOCRINE, NUTRITIONAL, METABOLIC AND IMMUNITY DISORDER: ICD-10-CM

## 2025-06-18 DIAGNOSIS — Z13.0 SCREENING FOR ENDOCRINE, NUTRITIONAL, METABOLIC AND IMMUNITY DISORDER: ICD-10-CM

## 2025-06-18 DIAGNOSIS — M21.611 BILATERAL BUNIONS: ICD-10-CM

## 2025-06-18 DIAGNOSIS — Z13.228 SCREENING FOR ENDOCRINE, NUTRITIONAL, METABOLIC AND IMMUNITY DISORDER: ICD-10-CM

## 2025-06-18 DIAGNOSIS — F41.8 SITUATIONAL ANXIETY: ICD-10-CM

## 2025-06-18 DIAGNOSIS — Z12.31 VISIT FOR SCREENING MAMMOGRAM: ICD-10-CM

## 2025-06-18 DIAGNOSIS — Z12.11 COLON CANCER SCREENING: ICD-10-CM

## 2025-06-18 DIAGNOSIS — M21.612 BILATERAL BUNIONS: ICD-10-CM

## 2025-06-18 LAB
CREAT UR-SCNC: 68.7 MG/DL
HEMOGLOBIN A1C: 6.4 % (ref 4.3–5.6)
MICROALBUMIN UR-MCNC: <0.3 MG/DL

## 2025-06-18 PROCEDURE — 82570 ASSAY OF URINE CREATININE: CPT | Performed by: FAMILY MEDICINE

## 2025-06-18 PROCEDURE — 82043 UR ALBUMIN QUANTITATIVE: CPT | Performed by: FAMILY MEDICINE

## 2025-06-18 PROCEDURE — 83036 HEMOGLOBIN GLYCOSYLATED A1C: CPT | Performed by: FAMILY MEDICINE

## 2025-06-18 PROCEDURE — 99215 OFFICE O/P EST HI 40 MIN: CPT | Performed by: FAMILY MEDICINE

## 2025-06-18 PROCEDURE — 99417 PROLNG OP E/M EACH 15 MIN: CPT | Performed by: FAMILY MEDICINE

## 2025-06-18 RX ORDER — AZELASTINE HYDROCHLORIDE 137 UG/1
1 SPRAY, METERED NASAL DAILY PRN
COMMUNITY

## 2025-06-18 RX ORDER — GLIPIZIDE 2.5 MG/1
2.5 TABLET ORAL
Qty: 90 TABLET | Refills: 1 | Status: SHIPPED | OUTPATIENT
Start: 2025-06-18

## 2025-06-18 NOTE — PROGRESS NOTES
The following individual(s) verbally consented to be recorded using ambient AI listening technology and understand that they can each withdraw their consent to this listening technology at any point by asking the clinician to turn off or pause the recording:    Patient name: Oneyda Ester Humphreys

## 2025-06-18 NOTE — PROGRESS NOTES
Oneyda Humphreys is a 55 year old female.       The following individual(s) verbally consented to be recorded using ambient AI listening technology and understand that they can each withdraw their consent to this listening technology at any point by asking the clinician to turn off or pause the recording:    Patient name: Oneyda Humphreys  Additional names:  none   History of Present Illness  Oneyda Humphreys is a 55 year old female with diabetes who presents for a follow-up visit.    Is due for bone density, mammogram    --- Intermittent abdominal pain  He had a CT scan abdomen pelvis ordered last year for intermittent abdominal pain she never completed the testing still gets the intermittent abdominal pain is very nonspecific about the discomfort or the location seems to be in the mid section and right lower quad  Had a umbilical hernia repair in the past and is not sure if that is associated with the effects after the repair is still interested in having the CT abdomen and pelvis done is requesting it to be reordered from 7/10/2024.    --- Diabetes  She manages her diabetes with metformin 750 mg extended release twice daily and atorvastatin 10 mg for cholesterol. She experiences diarrhea as a side effect of metformin, which has improved over time. Her blood sugar levels are typically around 150-160 mg/dL, with a hemoglobin A1c of 6.7%. Her blood sugar was 135 mg/dL this morning. No numbness or tingling in her feet, no excessive thirst, or hypoglycemic symptoms, although she experiences sweating, which she attributes to menopause.    She has a history of depression and anxiety treated about 15 years ago, which resulted in side effects of tardive dyskinesia. She is not currently on any mental health medication but experiences intermittent anxiety and depression influenced by life circumstances of her 's health.    She reports frequent urination, which had improved when she was on  the Mounjaro and is slightly increased again.      She is frustrated with insurance issues, particularly regarding coverage for medications and procedures was prescribed Mounjaro for diabetes but it was not covered by insurance    She is actively involved in managing her 's health care and medication adherence. He has Parkinson's disease, bladder cancer, and an enlarged prostate, experiencing frequent urination and fatigue. He uses a walker for stability and has not shown signs of dementia. She is planning a vacation and is concerned about her 's ability to handle travel due to his health conditions.       PHQ 9 is a had a 3  GAD7 is at a 2    Results for orders placed or performed in visit on 06/18/25   POC Hemoglobin A1C    Collection Time: 06/18/25 10:00 AM   Result Value Ref Range    HEMOGLOBIN A1C 6.4 (A) 4.3 - 5.6 %    Cartridge Lot# [id number] Numeric    Cartridge Expiration Date 02/10/2027 Date   Microalb/Creat Ratio, Random Urine    Collection Time: 06/18/25 10:00 AM   Result Value Ref Range    Microalbumin, Urine <0.30 mg/dL    Creatinine Ur Random 68.70 mg/dL    Malb/Cre Calc         Current Medications[1]   Past Medical History[2]   Social History:  Short Social Hx on File[3]     REVIEW OF SYSTEMS:   GENERAL HEALTH: feels well otherwise  SKIN: denies any unusual skin lesions or rashes  RESPIRATORY: denies shortness of breath with exertion  CARDIOVASCULAR: denies chest pain on exertion  GI: denies abdominal pain and denies heartburn  NEURO: denies headaches  Musculoskeletal: No motor deficits  Endocrine diabetes and hyperlipidemia  EXAM:   /76 (BP Location: Left arm, Patient Position: Sitting, Cuff Size: adult)   Pulse 96   Temp 97.5 °F (36.4 °C) (Temporal)   Resp 16   Ht 5' 4\" (1.626 m)   Wt 223 lb (101.2 kg)   SpO2 98%   BMI 38.28 kg/m²   GENERAL: well developed, well nourished,in no apparent distress  SKIN: no rashes,no suspicious lesions  EYES: PERRLA, EOM intact, sclera  clear without injection  NECK: supple,no adenopathy, thyroid normal to palpation  LUNGS: clear to auscultation no rales, rhonchi or wheezes  CARDIO: RRR without murmur  GI: Normal bowel sounds ×4 quadrant, no hepatosplenomegaly or masses, and no tenderness   EXTREMITIES: no cyanosis, clubbing or edema  Musculoskeletal: No gross deficit  Neurological: nerves II through XII grossly intact no sensorimotor deficit  Psychological: Mood and affect are normal.  Good communication skills.  Toenail fungus and bunion bilateral  Bilateral barefoot skin diabetic exam is abnormal with dystrophic nails and/or dry skin  Monofilament bilateral normal  ASSESSMENT AND PLAN:     Encounter Diagnoses   Name Primary?    Controlled type 2 diabetes mellitus without complication, without long-term current use of insulin (HCC) Yes    Mixed hyperlipidemia     Toenail fungus     Bilateral bunions     Screening for endocrine, nutritional, metabolic and immunity disorder     Visit for screening mammogram     Intermittent right lower quadrant abdominal pain     History of umbilical hernia repair     Postmenopausal     Colon cancer screening        Orders Placed This Encounter   Procedures    POC Hemoglobin A1C    Microalb/Creat Ratio, Random Urine    CBC With Differential With Platelet    Comp Metabolic Panel (14)    Hemoglobin A1C    Lipid Panel    TSH and Free T4    Occult Blood, Fecal, Immunoassay (Blue cards) [E]    Diabetic Retinopathy Exam  OU - Both Eyes       Meds & Refills for this Visit:  Requested Prescriptions     Signed Prescriptions Disp Refills    glipiZIDE 2.5 MG Oral Tab 90 tablet 1     Sig: Take 2.5 mg by mouth daily with breakfast.       Imaging & Consults:  PODIATRY - INTERNAL  [facility] SAMREEN 2D+3D SCREENING BILAT (CPT=77067/17696)  CT ABDOMEN+PELVIS(CONTRAST ONLY)(CPT=74177)  XR DEXA BONE DENSITOMETRY (CPT=77080)  1. Controlled type 2 diabetes mellitus without complication, without long-term current use of insulin (HCC)  Diabetes  management is suboptimal with hemoglobin A1c at 6.7%. Blood glucose levels are consistently above target, with fasting glucose at 135 mg/dL and postprandial levels reaching 150-160 mg/dL. Current treatment includes metformin 750 mg extended release twice daily, which causes mild diarrhea. Insurance constraints limit medication options to generics. Glipizide is considered to improve postprandial glucose control, with a low risk of hypoglycemia at the prescribed dose.  - Prescribe glipizide 2.5 mg daily with breakfast to improve postprandial glucose control.  - Continue metformin 750 mg extended release twice daily.  - Monitor for hypoglycemia symptoms such as shakiness or sweating.  - Recheck hemoglobin A1c in three months.  - Provide after-visit summary with lab orders for September.  - Comp Metabolic Panel (14); Future  - Hemoglobin A1C; Future  - Lipid Panel; Future  - Comp Metabolic Panel (14)  - Hemoglobin A1C  - Lipid Panel  - Podiatry Referral - [place] ([address])  - Diabetic Retinopathy Exam  OU - Both Eyes; Future    2. Mixed hyperlipidemia  Continue with atorvastatin 10 mg a day well-tolerated no side effects    3. Toenail fungus  - Podiatry Referral - [place] ([address])    4. Bilateral bunions  - Podiatry Referral - [place] ([address])    5. Screening for endocrine, nutritional, metabolic and immunity disorder  - CBC With Differential With Platelet; Future  - TSH and Free T4; Future  - CBC With Differential With Platelet  - TSH and Free T4    6. Visit for screening mammogram  - [facility] SAMREEN 2D+3D SCREENING BILAT (CPT=77067/24416); Future    7. Intermittent right lower quadrant abdominal pain  - CT ABDOMEN+PELVIS(CONTRAST ONLY)(CPT=74177); Future    8. History of umbilical hernia repair  - CT ABDOMEN+PELVIS(CONTRAST ONLY)(CPT=74177); Future    9. Postmenopausal  - XR DEXA BONE DENSITOMETRY (CPT=77080); Future    10. Colon cancer screening  - Occult Blood, Fecal, Immunoassay (Blue  cards) [E]; Future  - Occult Blood, Fecal, Immunoassay (Blue cards) [E]      Assessment & Plan    Anxiety  Anxiety is impacting daily life, particularly in managing her 's health issues. Previous treatment with mental health medication resulted in tardive dyskinesia, leading to discontinuation. Counseling is suggested as a non-pharmacological option. Consideration of anti-anxiety medication is discussed, weighing the potential benefits against past adverse effects.  - Consider counseling for anxiety management.  - Evaluate potential anti-anxiety medication options.    Psoriasis  Psoriasis is a chronic condition requiring ongoing management.    Hyperlipidemia  Hyperlipidemia is managed with atorvastatin 10 mg daily. She is also managing her 's medication adherence, ensuring he takes his prescribed atorvastatin.  - Continue atorvastatin 10 mg daily.    General Health Maintenance  Routine health maintenance includes screenings and vaccinations. Insurance issues have complicated access to some services. Discussed the need for mammogram and bone density scan, and the challenges with insurance coverage for these services.  - Reorder mammogram and bone density scan.  - Schedule CT abdomen and pelvis before July 10th expiration no order placed just in case unable to get to the CT scan .  - Defer pneumonia vaccine at this time per patient request.  - Provide fecal occult blood test card for colon cancer screening after July 19th.  - Discuss potential for dilated eye exam at [facility].    Recording duration: 46 minutes  Provider patient relationship has had a longitudinal long term relationship to address and treat complex conditions.  Time spent was 55 minutes more than 50% was spent on counseling regarding medical conditions and treatment.  Rest of time was spent reviewing chart, reviewing blood work and radiology tests.     The patient indicates understanding of these issues and agrees to the plan.  The  patient is asked to return in 6 months.    This note was created by [employer] voice recognition. Errors in content may be related to improper recognition by the system; efforts to review and correct have been done but errors may still exist.             [1]   Current Outpatient Medications   Medication Sig Dispense Refill    azelastine 137 MCG/SPRAY Nasal Solution 1 spray by Each Nare route daily as needed.      glipiZIDE 2.5 MG Oral Tab Take 2.5 mg by mouth daily with breakfast. 90 tablet 1    atorvastatin 10 MG Oral Tab Take 1 tablet (10 mg total) by mouth nightly. 90 tablet 0    metFORMIN  MG Oral Tablet 24 Hr Take 1 tablet (750 mg total) by mouth 2 (two) times daily with meals. 60 tablet 5    Coenzyme Q10 (COQ10 OR) Take by mouth.      magnesium oxide 400 MG Oral Tab Take 1 tablet (400 mg total) by mouth daily.      Melatonin 10 MG Oral Cap Take by mouth.      NON FORMULARY Source Naturals Hot Flash      Blood Glucose Monitoring Suppl (ONETOUCH ULTRA 2) w/Device Does not apply Kit Give appropriate One Touch strips with device 1 kit 0    OneTouch UltraSoft Lancets Does not apply Misc Check blood sugars fasting daily. 100 each 5    OMEGA 3-6-9 FATTY ACIDS OR Take by mouth.      calcium carbonate 500 MG Oral Chew Tab Chew 1 tablet (500 mg total) by mouth daily.      Loratadine 5 MG Oral Tablet Dispersible Take 1 tablet by mouth daily as needed.      aspirin 81 MG Oral Tab EC Take 1 tablet (81 mg total) by mouth daily.      Glucose Blood (ONETOUCH ULTRA) In Vitro Strip Check blood sugars in the morning fasting and 3 times a week 2 hours after main meal. (Patient not taking: Reported on 6/18/2025) 100 strip 5    St Johns Wort (ST JOHNS WORT MOOD RELAXER) 300 MG Oral Cap  (Patient not taking: Reported on 6/18/2025)     [2]   Past Medical History:   Psoriasis   [3]   Social History  Socioeconomic History    Marital status:    Occupational History    Occupation:    Tobacco Use    Smoking status:  Never     Passive exposure: Never    Smokeless tobacco: Never   Vaping Use    Vaping status: Never Used   Substance and Sexual Activity    Alcohol use: Yes     Alcohol/week: 2.0 standard drinks of alcohol     Types: 2 Standard drinks or equivalent per week    Drug use: Never    Sexual activity: Yes     Partners: Male   Other Topics Concern     Service No    Blood Transfusions No    Caffeine Concern Yes     Comment: 1-2 cups of coffee daily and 1-2 cans of pop or glasses of iced tea weekly    Occupational Exposure No    Hobby Hazards No    Sleep Concern No    Stress Concern No    Weight Concern No    Special Diet No    Back Care No    Exercise Yes     Comment: exercises 0-1 times week and occasional walks    Bike Helmet No    Seat Belt Yes    Self-Exams No     Social Drivers of Health     Food Insecurity: No Food Insecurity (6/18/2025)    NCSS - Food Insecurity     Worried About Running Out of Food in the Last Year: No     Ran Out of Food in the Last Year: No   Transportation Needs: No Transportation Needs (6/18/2025)    NCSS - Transportation     Lack of Transportation: No   Housing Stability: Not At Risk (6/18/2025)    NCSS - Housing/Utilities     Has Housing: Yes     Worried About Losing Housing: No     Unable to Get Utilities: No

## 2025-06-19 PROBLEM — H53.8 BLURRED VISION: Status: RESOLVED | Noted: 2024-07-10 | Resolved: 2025-06-19

## 2025-08-19 DIAGNOSIS — E78.2 MIXED HYPERLIPIDEMIA: ICD-10-CM

## 2025-08-19 DIAGNOSIS — E11.9 NEW ONSET TYPE 2 DIABETES MELLITUS (HCC): ICD-10-CM

## 2025-08-20 RX ORDER — ATORVASTATIN CALCIUM 10 MG/1
10 TABLET, FILM COATED ORAL NIGHTLY
Qty: 90 TABLET | Refills: 1 | Status: SHIPPED | OUTPATIENT
Start: 2025-08-20

## 2025-08-20 RX ORDER — METFORMIN HYDROCHLORIDE 750 MG/1
750 TABLET, EXTENDED RELEASE ORAL 2 TIMES DAILY WITH MEALS
Qty: 60 TABLET | Refills: 5 | Status: SHIPPED | OUTPATIENT
Start: 2025-08-20